# Patient Record
Sex: MALE | Race: WHITE | NOT HISPANIC OR LATINO | URBAN - METROPOLITAN AREA
[De-identification: names, ages, dates, MRNs, and addresses within clinical notes are randomized per-mention and may not be internally consistent; named-entity substitution may affect disease eponyms.]

---

## 2018-03-16 LAB — HCV AB SER-ACNC: NEGATIVE

## 2018-10-09 PROCEDURE — 88305 TISSUE EXAM BY PATHOLOGIST: CPT | Performed by: PATHOLOGY

## 2018-10-10 ENCOUNTER — LAB REQUISITION (OUTPATIENT)
Dept: LAB | Facility: HOSPITAL | Age: 63
End: 2018-10-10
Payer: MEDICARE

## 2018-10-10 DIAGNOSIS — D48.5 NEOPLASM OF UNCERTAIN BEHAVIOR OF SKIN: ICD-10-CM

## 2019-07-09 PROCEDURE — 88305 TISSUE EXAM BY PATHOLOGIST: CPT | Performed by: PATHOLOGY

## 2019-07-11 ENCOUNTER — LAB REQUISITION (OUTPATIENT)
Dept: LAB | Facility: HOSPITAL | Age: 64
End: 2019-07-11
Payer: MEDICARE

## 2019-07-11 DIAGNOSIS — D48.5 NEOPLASM OF UNCERTAIN BEHAVIOR OF SKIN: ICD-10-CM

## 2019-08-05 PROCEDURE — 88305 TISSUE EXAM BY PATHOLOGIST: CPT | Performed by: PATHOLOGY

## 2019-08-07 ENCOUNTER — LAB REQUISITION (OUTPATIENT)
Dept: LAB | Facility: HOSPITAL | Age: 64
End: 2019-08-07
Payer: MEDICARE

## 2019-08-07 DIAGNOSIS — D48.5 NEOPLASM OF UNCERTAIN BEHAVIOR OF SKIN: ICD-10-CM

## 2019-09-10 PROCEDURE — 88305 TISSUE EXAM BY PATHOLOGIST: CPT | Performed by: PATHOLOGY

## 2019-09-11 ENCOUNTER — LAB REQUISITION (OUTPATIENT)
Dept: LAB | Facility: HOSPITAL | Age: 64
End: 2019-09-11
Payer: MEDICARE

## 2019-09-11 DIAGNOSIS — D48.5 NEOPLASM OF UNCERTAIN BEHAVIOR OF SKIN: ICD-10-CM

## 2019-10-10 PROCEDURE — 88304 TISSUE EXAM BY PATHOLOGIST: CPT | Performed by: PATHOLOGY

## 2019-10-11 ENCOUNTER — LAB REQUISITION (OUTPATIENT)
Dept: LAB | Facility: HOSPITAL | Age: 64
End: 2019-10-11
Payer: MEDICARE

## 2019-10-11 DIAGNOSIS — D48.5 NEOPLASM OF UNCERTAIN BEHAVIOR OF SKIN: ICD-10-CM

## 2020-04-15 DIAGNOSIS — G89.29 CHRONIC BACK PAIN, UNSPECIFIED BACK LOCATION, UNSPECIFIED BACK PAIN LATERALITY: Primary | ICD-10-CM

## 2020-04-15 DIAGNOSIS — M54.9 CHRONIC BACK PAIN, UNSPECIFIED BACK LOCATION, UNSPECIFIED BACK PAIN LATERALITY: Primary | ICD-10-CM

## 2020-07-01 ENCOUNTER — TELEPHONE (OUTPATIENT)
Dept: FAMILY MEDICINE CLINIC | Facility: CLINIC | Age: 65
End: 2020-07-01

## 2020-07-01 DIAGNOSIS — J32.9 SINUSITIS, UNSPECIFIED CHRONICITY, UNSPECIFIED LOCATION: Primary | ICD-10-CM

## 2020-07-01 RX ORDER — AZITHROMYCIN 250 MG/1
TABLET, FILM COATED ORAL
Qty: 6 TABLET | Refills: 0 | Status: SHIPPED | OUTPATIENT
Start: 2020-07-01 | End: 2020-07-06

## 2020-07-01 NOTE — TELEPHONE ENCOUNTER
[id number] I guess we can order Anjelica Rush for Jorge Grand (I don't think he's allergic) and some claritin daily and pseudophed

## 2020-07-01 NOTE — TELEPHONE ENCOUNTER
Patient called said he has sinus infection symptoms, has been taking his roommates antibodic, but his left sinus cavity is still congested, he mentioned and antihistamine script        Nicholas Toro    Patient ph # [phone]

## 2020-10-09 ENCOUNTER — TELEPHONE (OUTPATIENT)
Dept: FAMILY MEDICINE CLINIC | Facility: CLINIC | Age: 65
End: 2020-10-09

## 2020-10-09 DIAGNOSIS — J32.9 OTHER SINUSITIS, UNSPECIFIED CHRONICITY: Primary | ICD-10-CM

## 2020-10-09 RX ORDER — AZITHROMYCIN 250 MG/1
TABLET, FILM COATED ORAL
Qty: 6 TABLET | Refills: 0 | Status: SHIPPED | OUTPATIENT
Start: 2020-10-09 | End: 2020-10-13

## 2020-11-03 DIAGNOSIS — Z76.0 MEDICATION REFILL: Primary | ICD-10-CM

## 2020-11-03 RX ORDER — GABAPENTIN 300 MG/1
CAPSULE ORAL
Qty: 60 CAPSULE | Refills: 0 | Status: SHIPPED | OUTPATIENT
Start: 2020-11-03 | End: 2020-12-07

## 2020-11-10 RX ORDER — CALCIUM CITRATE/VITAMIN D3 200MG-6.25
TABLET ORAL
COMMUNITY
End: 2020-11-11

## 2020-11-10 RX ORDER — NAPROXEN SODIUM 220 MG
220 TABLET ORAL
COMMUNITY
End: 2020-12-30 | Stop reason: ALTCHOICE

## 2020-11-10 RX ORDER — SENNOSIDES 8.6 MG
650 CAPSULE ORAL EVERY 8 HOURS PRN
COMMUNITY

## 2020-11-10 RX ORDER — OMEPRAZOLE 20 MG/1
20 CAPSULE, DELAYED RELEASE ORAL DAILY
COMMUNITY

## 2020-11-10 RX ORDER — ERGOCALCIFEROL 1.25 MG/1
5000 CAPSULE ORAL DAILY
COMMUNITY
End: 2020-12-30 | Stop reason: ALTCHOICE

## 2020-11-10 RX ORDER — TRAMADOL HYDROCHLORIDE 50 MG/1
100 TABLET ORAL DAILY PRN
COMMUNITY
End: 2020-11-11

## 2020-11-11 ENCOUNTER — TELEPHONE (OUTPATIENT)
Dept: FAMILY MEDICINE CLINIC | Facility: CLINIC | Age: 65
End: 2020-11-11

## 2020-11-11 ENCOUNTER — OFFICE VISIT (OUTPATIENT)
Dept: FAMILY MEDICINE CLINIC | Facility: CLINIC | Age: 65
End: 2020-11-11
Payer: MEDICARE

## 2020-11-11 VITALS
HEART RATE: 85 BPM | DIASTOLIC BLOOD PRESSURE: 70 MMHG | SYSTOLIC BLOOD PRESSURE: 162 MMHG | OXYGEN SATURATION: 95 % | HEIGHT: 74 IN | RESPIRATION RATE: 16 BRPM | WEIGHT: 280.38 LBS | BODY MASS INDEX: 35.98 KG/M2

## 2020-11-11 DIAGNOSIS — H57.12 EYE PAIN, LEFT: Primary | ICD-10-CM

## 2020-11-11 DIAGNOSIS — M54.2 NECK PAIN: ICD-10-CM

## 2020-11-11 PROBLEM — I44.7 LEFT BUNDLE BRANCH BLOCK: Status: ACTIVE | Noted: 2020-11-11

## 2020-11-11 PROBLEM — I47.1 ATRIAL TACHYCARDIA (HCC): Status: ACTIVE | Noted: 2020-11-11

## 2020-11-11 PROBLEM — I51.7 LEFT VENTRICULAR HYPERTROPHY: Status: ACTIVE | Noted: 2020-11-11

## 2020-11-11 PROBLEM — M54.9 CHRONIC BACK PAIN: Status: ACTIVE | Noted: 2020-11-11

## 2020-11-11 PROBLEM — E23.6 PITUITARY MASS (HCC): Status: ACTIVE | Noted: 2020-11-11

## 2020-11-11 PROBLEM — I47.19 ATRIAL TACHYCARDIA: Status: ACTIVE | Noted: 2020-11-11

## 2020-11-11 PROBLEM — Z98.1 H/O SPINAL FUSION: Status: ACTIVE | Noted: 2020-11-11

## 2020-11-11 PROBLEM — G89.29 CHRONIC BACK PAIN: Status: ACTIVE | Noted: 2020-11-11

## 2020-11-11 PROCEDURE — 99214 OFFICE O/P EST MOD 30 MIN: CPT | Performed by: INTERNAL MEDICINE

## 2020-11-11 RX ORDER — GLUCOSAMINE/D3/BOSWELLIA SERRA 1500MG-400
TABLET ORAL
COMMUNITY

## 2020-11-11 RX ORDER — CALCIUM CITRATE/VITAMIN D3 200MG-6.25
1 TABLET ORAL
COMMUNITY
End: 2020-11-11

## 2020-11-11 RX ORDER — METOPROLOL SUCCINATE 25 MG/1
25 TABLET, EXTENDED RELEASE ORAL 2 TIMES DAILY
COMMUNITY
Start: 2020-10-21 | End: 2020-12-30 | Stop reason: ALTCHOICE

## 2020-11-11 RX ORDER — ONDANSETRON 4 MG/1
4 TABLET, ORALLY DISINTEGRATING ORAL DAILY PRN
COMMUNITY
End: 2020-12-30 | Stop reason: ALTCHOICE

## 2020-11-11 RX ORDER — PREDNISONE 20 MG/1
TABLET ORAL
Qty: 60 TABLET | Refills: 0 | Status: SHIPPED | OUTPATIENT
Start: 2020-11-11 | End: 2020-12-30 | Stop reason: ALTCHOICE

## 2020-11-11 RX ORDER — FLUTICASONE PROPIONATE 50 MCG
2 SPRAY, SUSPENSION (ML) NASAL
COMMUNITY
End: 2021-03-08

## 2020-11-11 RX ORDER — FINASTERIDE 5 MG/1
5 TABLET, FILM COATED ORAL 2 TIMES DAILY
COMMUNITY
Start: 2020-10-07 | End: 2021-09-03

## 2020-11-11 RX ORDER — TADALAFIL 20 MG/1
20 TABLET ORAL
COMMUNITY
End: 2021-09-03

## 2020-11-13 ENCOUNTER — TELEPHONE (OUTPATIENT)
Dept: FAMILY MEDICINE CLINIC | Facility: CLINIC | Age: 65
End: 2020-11-13

## 2020-11-18 ENCOUNTER — TELEPHONE (OUTPATIENT)
Dept: FAMILY MEDICINE CLINIC | Facility: CLINIC | Age: 65
End: 2020-11-18

## 2020-11-19 DIAGNOSIS — M31.6 TEMPORAL ARTERITIS (HCC): Primary | ICD-10-CM

## 2020-12-07 DIAGNOSIS — Z76.0 MEDICATION REFILL: ICD-10-CM

## 2020-12-07 RX ORDER — GABAPENTIN 300 MG/1
CAPSULE ORAL
Qty: 60 CAPSULE | Refills: 0 | Status: SHIPPED | OUTPATIENT
Start: 2020-12-07 | End: 2021-01-04 | Stop reason: SDUPTHER

## 2020-12-27 ENCOUNTER — NURSE TRIAGE (OUTPATIENT)
Dept: OTHER | Facility: OTHER | Age: 65
End: 2020-12-27

## 2020-12-27 DIAGNOSIS — R11.0 NAUSEA: Primary | ICD-10-CM

## 2020-12-27 RX ORDER — ONDANSETRON 4 MG/1
4 TABLET, ORALLY DISINTEGRATING ORAL EVERY 6 HOURS PRN
Qty: 20 TABLET | Refills: 0 | Status: SHIPPED | OUTPATIENT
Start: 2020-12-27 | End: 2021-03-08

## 2020-12-30 PROBLEM — Z86.010 HISTORY OF COLONIC POLYPS: Status: ACTIVE | Noted: 2020-12-30

## 2020-12-30 PROBLEM — K76.0 FATTY LIVER: Status: ACTIVE | Noted: 2020-12-30

## 2020-12-30 PROBLEM — K31.A0 INTESTINAL METAPLASIA OF GASTRIC CARDIA: Status: ACTIVE | Noted: 2020-12-30

## 2020-12-30 PROBLEM — J45.909 ASTHMA: Status: ACTIVE | Noted: 2020-12-30

## 2020-12-30 PROBLEM — Z86.0100 HISTORY OF COLONIC POLYPS: Status: ACTIVE | Noted: 2020-12-30

## 2021-01-04 DIAGNOSIS — Z76.0 MEDICATION REFILL: ICD-10-CM

## 2021-01-04 RX ORDER — GABAPENTIN 300 MG/1
300 CAPSULE ORAL 2 TIMES DAILY
Qty: 60 CAPSULE | Refills: 3 | Status: SHIPPED | OUTPATIENT
Start: 2021-01-04 | End: 2021-05-03

## 2021-01-04 NOTE — TELEPHONE ENCOUNTER
gabapentin (NEURONTIN) 300 mg capsule takes 2 daily at bedtime Qty 60 with refill to [facility] #[id number]   [place]

## 2021-02-03 ENCOUNTER — TELEMEDICINE (OUTPATIENT)
Dept: FAMILY MEDICINE CLINIC | Facility: CLINIC | Age: 66
End: 2021-02-03
Payer: MEDICARE

## 2021-02-03 VITALS — BODY MASS INDEX: 35.68 KG/M2 | WEIGHT: 278 LBS | HEIGHT: 74 IN | TEMPERATURE: 97.6 F

## 2021-02-03 DIAGNOSIS — R39.9 UTI SYMPTOMS: Primary | ICD-10-CM

## 2021-02-03 PROCEDURE — 99213 OFFICE O/P EST LOW 20 MIN: CPT | Performed by: INTERNAL MEDICINE

## 2021-02-03 RX ORDER — FLUCONAZOLE 150 MG/1
TABLET ORAL
Qty: 7 TABLET | Refills: 3 | Status: SHIPPED | OUTPATIENT
Start: 2021-02-03 | End: 2021-02-04 | Stop reason: SDUPTHER

## 2021-02-03 RX ORDER — SULFAMETHOXAZOLE AND TRIMETHOPRIM 800; 160 MG/1; MG/1
1 TABLET ORAL EVERY 12 HOURS SCHEDULED
Qty: 14 TABLET | Refills: 0 | Status: SHIPPED | OUTPATIENT
Start: 2021-02-03 | End: 2021-02-09 | Stop reason: SDUPTHER

## 2021-02-03 NOTE — PROGRESS NOTES
Virtual Regular Visit      Assessment/Plan:    Problem List Items Addressed This Visit     None      Visit Diagnoses     UTI symptoms    -  Primary    Relevant Medications    sulfamethoxazole-trimethoprim (BACTRIM DS) 800-160 mg per tablet    fluconazole (DIFLUCAN) 150 mg tablet    BMI 35 0-35 9,adult                   Reason for visit is   Chief Complaint   Patient presents with    Virtual Regular Visit     [employer]    Dark Urine    Difficulty Urinating    Urinary Frequency    x 4-5 days        Encounter provider Haily Stein MD    Provider located at [address] [place] [id number] [phone]      Recent Visits  No visits were found meeting these conditions  Showing recent visits within past 7 days and meeting all other requirements     Today's Visits  Date Type Provider Dept   02/03/21 Telemedicine Brendon Camacho MD  [facility] today's visits and meeting all other requirements     Future Appointments  No visits were found meeting these conditions  Showing future appointments within next 150 days and meeting all other requirements        The patient was identified by name and date of birth  Wesley David was informed that this is a telemedicine visit and that the visit is being conducted through [employer] and patient was informed that this is not a secure, HIPAA-compliant platform  He agrees to proceed     My office door was closed  No one else was in the room  He acknowledged consent and understanding of privacy and security of the video platform  The patient has agreed to participate and understands they can discontinue the visit at any time  Patient is aware this is a billable service  Subjective  Wesley David is a 76 y o  male with urinary burning, groin irritation         Ada Perfect has been having burning, frequency, and dark urine for a few days-has also had what he thinks is groin rash that is itchy and smells, and seems fungal-he is circumcised-no discharge, no fever or back pain or vomiting-does have some nausea-       Past Medical History:   Diagnosis Date    Alkaline phosphatase raised     Arthralgia of foot     arthralgia of the ankle and/or foot    Arthritis     Aspirin allergy     Asthma     Chronic back pain     Cobalamin deficiency     Displacement of cervical intervertebral disc without myelopathy     Edema of lower extremity     Gallstone     GERD (gastroesophageal reflux disease)     Heart murmur     Hepatitis B     History of diabetes mellitus     History of echocardiogram 02/23/2017    Nondilated LV with well-preserved systolic function and estimated EF of 50%  Mild septal dyskinesis was noted with otherwise normal wall motion  Moderate to severe LVH was noted with evidence of grade I diastolic dysfunction  Aortic valve sclerosis and mitral annular calcifcation were present with no evidence of significant valvular stenosis  Color Doppler examination as remarkable for trace MR, 1    History of EKG 09/15/2017    Sinus rhythm with 1st degree AV block, Left bundle branch block, Abnormal ECG  EKG 5/25/17-Sinus rhythm with 1st degree AV block, Sinus rhythm with occasional premature ventricular complexes and fusion complexes  Cannot rule out Anteroseptal infarct, age undertermind  T wave abnormality, consider lateral ischemia abnormal ECG  EKG 3/24/17-Sinus rhythm with 1st degree AV block with occasional giorgio    History of stress test 03/2017     Mildly abnormal study with evidence of a fixed inferoseptal defect  This is most likely related to attenuation artifact, however, the possibility of a nontransmural myocardial infarction involving these segments cannot be completely excluded  Quantitative analysis indicates equivocal ischemia in the mid segment of the septum  This is most likely related to artifacts   Gated wall motion analysis wa    Hypertension     Hypoglycemia     Hypothyroidism     Increased body mass index     Induration penis plastica     Mononeuritis     Neuropathy     Non-alcoholic fatty liver disease     Obesity     Osteopenia     Osteoporosis     Palpitations     Pituitary adenoma (HCC)     Pneumonia     Sebaceous cyst     infection of sebaceous cyst    Testicular hypofunction     Thrombocytopenic disorder (HCC)     Tremor     Vitamin D deficiency        Past Surgical History:   Procedure Laterality Date    BACK SURGERY  2010 2010- Fusion L4-L5-S1    COLOGUARD (HISTORICAL)  04/01/2019    Negative    CYST REMOVAL  1987 1987- Sebaceous cyst removed from back    EGD  07/15/2019    EGD  04/03/2018    HERNIA REPAIR  1957    LAMINECTOMY      Laminectomy with internal fixation L4-S1    LIPOMA RESECTION Right 2014    abdomen    LUMBAR FUSION  2010    L4-L5-S1    MOLE REMOVAL      back and face    TOENAIL EXCISION Left 1993       Current Outpatient Medications   Medication Sig Dispense Refill    acetaminophen (TYLENOL) 650 mg CR tablet Take 650 mg by mouth every 8 (eight) hours as needed      B Complex Vitamins (VITAMIN B COMPLEX PO) Take 1 tablet by mouth 2 (two) times a day       Biotin 64320 MCG TABS       Calcium Carb-Cholecalciferol (CALCIUM/VITAMIN D PO)       carvedilol (COREG) 3 125 mg tablet Take 3 125 mg by mouth 2 (two) times a day with meals      Cholecalciferol (Vitamin D3) 50 MCG (2000 UT) TABS Take 2,000 Units by mouth daily      finasteride (PROSCAR) 5 mg tablet Take 5 mg by mouth 2 (two) times a day       fluticasone (FLONASE) 50 mcg/act nasal spray 2 sprays into each nostril      gabapentin (NEURONTIN) 300 mg capsule Take 1 capsule (300 mg total) by mouth 2 (two) times a day 60 capsule 3    Ginger, Zingiber officinalis, (Ginger Root) 500 MG CAPS Take 1 capsule by mouth 3 (three) times a day      Lactobacillus (ACIDOPHILUS PO) Take 1 tablet by mouth 2 (two) times a day      omeprazole (PriLOSEC) 20 mg delayed release capsule Take 20 mg by mouth daily      ondansetron (ZOFRAN-ODT) 4 mg disintegrating tablet Take 1 tablet (4 mg total) by mouth every 6 (six) hours as needed for nausea or vomiting 20 tablet 0    Polyethyl Glycol-Propyl Glycol (SYSTANE OP)       primidone (MYSOLINE) 50 mg tablet Take 100 mg by mouth daily at bedtime      Simethicone (GAS-X PO) Take by mouth 3 (three) times a day with meals      tadalafil (Cialis) 20 MG tablet Take 20 mg by mouth      traMADol (ULTRAM) 50 mg tablet Take 50 mg by mouth daily at bedtime      fluconazole (DIFLUCAN) 150 mg tablet Take 1 tablet PO X 1 and can repeat dose in 5-7 days as needed 7 tablet 3    sulfamethoxazole-trimethoprim (BACTRIM DS) 800-160 mg per tablet Take 1 tablet by mouth every 12 (twelve) hours for 7 days 14 tablet 0     No current facility-administered medications for this visit  Allergies   Allergen Reactions    Metoprolol Shortness Of Breath and Fatigue    Aspirin GI Intolerance    Ciprofloxacin Other (See Comments)     irregular heart rate    Propranolol Other (See Comments)     Dizzy, lathargic    Pollen Extract Other (See Comments)     Itchy eyes, runny nose, scratchy throat       Review of Systems   Constitutional: Negative for fever  Gastrointestinal: Negative for nausea and vomiting  Genitourinary: Positive for dysuria, frequency and urgency  Negative for discharge and hematuria  Musculoskeletal: Negative for back pain  Skin: Positive for rash  Video Exam    Vitals:    02/03/21 1034   Temp: 97 6 °F (36 4 °C)   TempSrc: Oral   Weight: 126 kg (278 lb)   Height: 6' 2" (1 88 m)       Physical Exam  Constitutional:       Appearance: Normal appearance  Eyes:      General: No scleral icterus  Neck:      Musculoskeletal: Normal range of motion  Pulmonary:      Effort: Pulmonary effort is normal    Neurological:      General: No focal deficit present  Mental Status: He is alert and oriented to person, place, and time  Psychiatric:         Mood and Affect: Mood normal          Behavior: Behavior normal           I spent 20 minutes directly with the patient during this visit      VIRTUAL VISIT DISCLAIMER    Angelica Moncho acknowledges that he has consented to an online visit or consultation  He understands that the online visit is based solely on information provided by him, and that, in the absence of a face-to-face physical evaluation by the physician, the diagnosis he receives is both limited and provisional in terms of accuracy and completeness  This is not intended to replace a full medical face-to-face evaluation by the physician  Angelica Ivan understands and accepts these terms  BMI Counseling: Body mass index is 35 69 kg/m²  The BMI is above normal  Nutrition recommendations include reducing portion sizes, decreasing overall calorie intake, 3-5 servings of fruits/vegetables daily, reducing fast food intake, consuming healthier snacks, decreasing soda and/or juice intake and moderation in carbohydrate intake

## 2021-02-04 DIAGNOSIS — R39.9 UTI SYMPTOMS: ICD-10-CM

## 2021-02-04 RX ORDER — FLUCONAZOLE 150 MG/1
TABLET ORAL
Qty: 5 TABLET | Refills: 3 | Status: SHIPPED | OUTPATIENT
Start: 2021-02-04 | End: 2021-02-11

## 2021-02-04 NOTE — TELEPHONE ENCOUNTER
[facility] (Urgent [facility]), is calling on the patients behalf  He picked up medication from pharmacy but only Bactrim was there, they did not dispense Diflucan  Can someone order this for him?

## 2021-02-09 ENCOUNTER — TELEPHONE (OUTPATIENT)
Dept: FAMILY MEDICINE CLINIC | Facility: CLINIC | Age: 66
End: 2021-02-09

## 2021-02-09 ENCOUNTER — OFFICE VISIT (OUTPATIENT)
Dept: FAMILY MEDICINE CLINIC | Facility: CLINIC | Age: 66
End: 2021-02-09
Payer: MEDICARE

## 2021-02-09 VITALS
RESPIRATION RATE: 16 BRPM | SYSTOLIC BLOOD PRESSURE: 142 MMHG | OXYGEN SATURATION: 96 % | HEART RATE: 73 BPM | WEIGHT: 276 LBS | BODY MASS INDEX: 35.42 KG/M2 | DIASTOLIC BLOOD PRESSURE: 80 MMHG | HEIGHT: 74 IN | TEMPERATURE: 98.1 F

## 2021-02-09 DIAGNOSIS — R39.9 UTI SYMPTOMS: Primary | ICD-10-CM

## 2021-02-09 DIAGNOSIS — R32 URINARY INCONTINENCE, UNSPECIFIED TYPE: ICD-10-CM

## 2021-02-09 DIAGNOSIS — R30.0 DYSURIA: ICD-10-CM

## 2021-02-09 DIAGNOSIS — N40.1 BENIGN PROSTATIC HYPERPLASIA WITH LOWER URINARY TRACT SYMPTOMS, SYMPTOM DETAILS UNSPECIFIED: ICD-10-CM

## 2021-02-09 DIAGNOSIS — R35.0 URINARY FREQUENCY: ICD-10-CM

## 2021-02-09 PROBLEM — I73.9 PAD (PERIPHERAL ARTERY DISEASE) (HCC): Status: ACTIVE | Noted: 2021-02-09

## 2021-02-09 PROBLEM — R16.1 SPLENOMEGALY: Status: ACTIVE | Noted: 2020-03-01

## 2021-02-09 PROBLEM — M75.41 IMPINGEMENT SYNDROME OF RIGHT SHOULDER: Status: ACTIVE | Noted: 2020-09-17

## 2021-02-09 PROBLEM — I51.7 LVH (LEFT VENTRICULAR HYPERTROPHY): Status: ACTIVE | Noted: 2020-11-17

## 2021-02-09 PROBLEM — M50.30 DDD (DEGENERATIVE DISC DISEASE), CERVICAL: Status: ACTIVE | Noted: 2021-01-15

## 2021-02-09 PROBLEM — Z98.890 S/P LAMINECTOMY: Status: ACTIVE | Noted: 2020-02-29

## 2021-02-09 PROBLEM — M25.511 RIGHT SHOULDER PAIN: Status: ACTIVE | Noted: 2020-09-08

## 2021-02-09 PROBLEM — G25.0 ESSENTIAL TREMOR: Status: ACTIVE | Noted: 2021-01-13

## 2021-02-09 PROBLEM — D69.6 THROMBOCYTOPENIA (HCC): Status: ACTIVE | Noted: 2020-03-01

## 2021-02-09 PROBLEM — M54.12 CERVICAL RADICULOPATHY: Status: ACTIVE | Noted: 2021-01-15

## 2021-02-09 LAB
SL AMB  POCT GLUCOSE, UA: NEGATIVE
SL AMB LEUKOCYTE ESTERASE,UA: NEGATIVE
SL AMB POCT BILIRUBIN,UA: NEGATIVE
SL AMB POCT BLOOD,UA: NEGATIVE
SL AMB POCT CLARITY,UA: CLEAR
SL AMB POCT COLOR,UA: YELLOW
SL AMB POCT KETONES,UA: NEGATIVE
SL AMB POCT NITRITE,UA: NEGATIVE
SL AMB POCT PH,UA: 6.5
SL AMB POCT SPECIFIC GRAVITY,UA: 1
SL AMB POCT URINE PROTEIN: NEGATIVE
SL AMB POCT UROBILINOGEN: 4

## 2021-02-09 PROCEDURE — 81002 URINALYSIS NONAUTO W/O SCOPE: CPT | Performed by: FAMILY MEDICINE

## 2021-02-09 PROCEDURE — 99214 OFFICE O/P EST MOD 30 MIN: CPT | Performed by: FAMILY MEDICINE

## 2021-02-09 PROCEDURE — 87086 URINE CULTURE/COLONY COUNT: CPT | Performed by: FAMILY MEDICINE

## 2021-02-09 RX ORDER — SULFAMETHOXAZOLE AND TRIMETHOPRIM 800; 160 MG/1; MG/1
1 TABLET ORAL EVERY 12 HOURS SCHEDULED
Qty: 14 TABLET | Refills: 0 | Status: SHIPPED | OUTPATIENT
Start: 2021-02-09 | End: 2021-02-16

## 2021-02-09 RX ORDER — NYSTATIN 100000 [USP'U]/G
1 POWDER TOPICAL 2 TIMES DAILY PRN
COMMUNITY
End: 2021-07-26

## 2021-02-09 RX ORDER — TAMSULOSIN HYDROCHLORIDE 0.4 MG/1
0.4 CAPSULE ORAL
Qty: 30 CAPSULE | Refills: 0 | Status: SHIPPED | OUTPATIENT
Start: 2021-02-09 | End: 2021-09-03

## 2021-02-09 NOTE — PROGRESS NOTES
Assessment/Plan:    No problem-specific Assessment & Plan notes found for this encounter  Diagnoses and all orders for this visit:    UTI symptoms  -     POCT urine dip  -     sulfamethoxazole-trimethoprim (BACTRIM DS) 800-160 mg per tablet; Take 1 tablet by mouth every 12 (twelve) hours for 7 days  -     Urine culture; Future    BMI 35 0-35 9,adult    Urinary frequency    Dysuria    Urinary incontinence, unspecified type    Benign prostatic hyperplasia with lower urinary tract symptoms, symptom details unspecified  -     tamsulosin (FLOMAX) 0 4 mg; Take 1 capsule (0 4 mg total) by mouth daily with dinner     Patient with known BPH for which he takes tamsulosin  Now with frequency, urgency, nocturia, dysuria and some leakage  Urinalysis here in office today was entirely normal    Prostatitis vs due to overactive bladder vs secondary to his BPH  Will send urine for culture  Will have him continue bactrim for another 7 days for total of 14 days  Unable to use cipro as he has allergy to this  Will give him rx for flomax once daily to see if this helps his flow  Discussed potential side effects  Advised he call his urologist (Dr Rosa Lopez) for appt  He was advised that he should call if any problems scheduling this appt  Subjective:      Patient ID: Duke Green is a 76 y o  male with atrial tachycardia, cervical radiculopathy, essential tremor, pituitary adenoma, bph, ED here today for acute visit  Is complaining of dysuria, urinary frequency, nocturia and straining to have to urinate  He has some leakage of urine as well  Symptoms ongoing for last month  No hematuria, abdominal pain, flank pain  No history of kidney stones  He was seen by video visit on 2/3/21 by Dr Tristan Morley for complaint of urinary burning and groin rash  Was treated empirically with Bactrim and po diflucan  He is on last day of bactrim and does not see any improvement in his symptoms       He follows with urology at [facility] (Dr Ata Frias) and was there on 1/6/21 for his nocturia, bph and ED  He is taking finasteride  Has had some cold symptoms in the last month as well for which he has been taking otc cold meds (mucinex)    HPI    The following portions of the patient's history were reviewed and updated as appropriate:   He  has a past medical history of Alkaline phosphatase raised, Arthralgia of foot, Arthritis, Aspirin allergy, Asthma, Chronic back pain, Cobalamin deficiency, Displacement of cervical intervertebral disc without myelopathy, Edema of lower extremity, Gallstone, GERD (gastroesophageal reflux disease), Heart murmur, Hepatitis B, History of diabetes mellitus, History of echocardiogram (02/23/2017), History of EKG (09/15/2017), History of stress test (03/2017), Hypertension, Hypoglycemia, Hypothyroidism, Increased body mass index, Induration penis plastica, Mononeuritis, Neuropathy, Non-alcoholic fatty liver disease, Obesity, Osteopenia, Osteoporosis, Palpitations, Pituitary adenoma ([address] ), Pneumonia, Sebaceous cyst, Testicular hypofunction, Thrombocytopenic disorder ([address] ), Tremor, and Vitamin D deficiency  He  has a past surgical history that includes Lipoma resection (Right, 2014); Lumbar fusion (2010); Toenail excision (Left, 1993); Laminectomy; Mole removal; Hernia repair (5861); EGD (07/15/2019); EGD (04/03/2018); Back surgery (2010); Cyst Removal (1987); and Cologuard (Historical) (04/01/2019)  He  reports that he has never smoked  He has never used smokeless tobacco  He reports previous alcohol use  He reports previous drug use    Current Outpatient Medications on File Prior to Visit   Medication Sig    acetaminophen (TYLENOL) 650 mg CR tablet Take 650 mg by mouth every 8 (eight) hours as needed    B Complex Vitamins (VITAMIN B COMPLEX PO) Take 1 tablet by mouth 2 (two) times a day     Biotin 73266 MCG TABS     Calcium Carb-Cholecalciferol (CALCIUM/VITAMIN D PO)     carvedilol (COREG) 3 125 mg tablet Take 3 125 mg by mouth 2 (two) times a day with meals    Cholecalciferol (Vitamin D3) 50 MCG (2000 UT) TABS Take 2,000 Units by mouth daily    finasteride (PROSCAR) 5 mg tablet Take 5 mg by mouth 2 (two) times a day     fluticasone (FLONASE) 50 mcg/act nasal spray 2 sprays into each nostril    gabapentin (NEURONTIN) 300 mg capsule Take 1 capsule (300 mg total) by mouth 2 (two) times a day    Ginger, Zingiber officinalis, (Ginger Root) 500 MG CAPS Take 1 capsule by mouth 3 (three) times a day    Lactobacillus (ACIDOPHILUS PO) Take 1 tablet by mouth 2 (two) times a day    nystatin (MYCOSTATIN) powder Apply 1 application topically 2 (two) times a day as needed    omeprazole (PriLOSEC) 20 mg delayed release capsule Take 20 mg by mouth daily    ondansetron (ZOFRAN-ODT) 4 mg disintegrating tablet Take 1 tablet (4 mg total) by mouth every 6 (six) hours as needed for nausea or vomiting    Polyethyl Glycol-Propyl Glycol (SYSTANE OP)     primidone (MYSOLINE) 50 mg tablet Take 100 mg by mouth daily at bedtime    Simethicone (GAS-X PO) Take by mouth 3 (three) times a day with meals    sulfamethoxazole-trimethoprim (BACTRIM DS) 800-160 mg per tablet Take 1 tablet by mouth every 12 (twelve) hours for 7 days    tadalafil (Cialis) 20 MG tablet Take 20 mg by mouth    traMADol (ULTRAM) 50 mg tablet Take 50 mg by mouth daily at bedtime          No current facility-administered medications on file prior to visit  He is allergic to metoprolol; aspirin; ciprofloxacin; propranolol; and pollen extract       Review of Systems   Constitutional: Negative for chills and fever  Gastrointestinal: Positive for diarrhea  Negative for abdominal pain, constipation, nausea and vomiting  Genitourinary: Positive for difficulty urinating, dysuria and frequency  Negative for flank pain and hematuria  Musculoskeletal: Negative for back pain           Objective:      Pulse 73   Temp 98 1 °F (36 7 °C) (Temporal)   Resp 16   Ht 6' 2" (1 88 m)   Wt 125 kg (276 lb)   SpO2 96%   BMI 35 44 kg/m²          Physical Exam  Vitals signs and nursing note reviewed  Constitutional:       Appearance: Normal appearance  He is obese  HENT:      Head: Normocephalic and atraumatic  Eyes:      Extraocular Movements: Extraocular movements intact  Conjunctiva/sclera: Conjunctivae normal       Pupils: Pupils are equal, round, and reactive to light  Cardiovascular:      Rate and Rhythm: Normal rate and regular rhythm  Pulmonary:      Effort: Pulmonary effort is normal       Breath sounds: Normal breath sounds  Genitourinary:     Comments: Prostate enlarged, no nodules  boggy  Musculoskeletal:      Right lower leg: No edema  Left lower leg: No edema  Neurological:      Mental Status: He is alert

## 2021-02-10 LAB — BACTERIA UR CULT: NORMAL

## 2021-02-10 NOTE — RESULT ENCOUNTER NOTE
Since there is no infection, he may d/c antibiotic  Will place referral to [facility]  Please let us know if difficulty scheduling  Thanks

## 2021-02-10 NOTE — RESULT ENCOUNTER NOTE
Please call patient to let him know that his urine culture did not show evidence of infection     Find out if he was able to schedule appt with his urologist, Dr Rose Schools

## 2021-02-16 ENCOUNTER — TELEPHONE (OUTPATIENT)
Dept: FAMILY MEDICINE CLINIC | Facility: CLINIC | Age: 66
End: 2021-02-16

## 2021-02-16 NOTE — TELEPHONE ENCOUNTER
Pt  Called stating he is still having really bad diarrhea  He states he went thru a whole bottle of imodium and nothing is working  He also states he has a chronic Head ache  He was just seen by Dr Praful Dasilva last week

## 2021-02-16 NOTE — TELEPHONE ENCOUNTER
I spoke with Detra Schirmer about his diarrhea-was just on course of Bactrim and now has diarrhea that has been persistent for several days-tried immodium but not that effective-will give it a few days and if it doesn't go away we will order a stool culture and C diff-he will try some pepto bismol and also drink clear fluids, and eat a dairy free gluten free bland diet-may need GI referral as well if not better-for his head congestion and rhinorrhea we thought a trial of claritin and a nose spray might help-he will try it

## 2021-03-05 ENCOUNTER — TELEPHONE (OUTPATIENT)
Dept: FAMILY MEDICINE CLINIC | Facility: CLINIC | Age: 66
End: 2021-03-05

## 2021-03-05 ENCOUNTER — TELEPHONE (OUTPATIENT)
Dept: UROLOGY | Facility: MEDICAL CENTER | Age: 66
End: 2021-03-05

## 2021-03-05 NOTE — TELEPHONE ENCOUNTER
Patient called urology to see if he could get in sooner but they put him on a waiting list  Wants to know if he should see you in the meanwhile

## 2021-03-05 NOTE — TELEPHONE ENCOUNTER
New Patient will be seen in the [facility] office  Patient was transferred by PCP office  Patient advised that he is still having burning at the tip of his penis but his urine culture came back negative  Patient advised that he would like to be seen sooner if possible  Please advise

## 2021-03-08 ENCOUNTER — OFFICE VISIT (OUTPATIENT)
Dept: FAMILY MEDICINE CLINIC | Facility: CLINIC | Age: 66
End: 2021-03-08
Payer: MEDICARE

## 2021-03-08 VITALS
SYSTOLIC BLOOD PRESSURE: 152 MMHG | TEMPERATURE: 98.1 F | BODY MASS INDEX: 36.57 KG/M2 | HEIGHT: 74 IN | WEIGHT: 285 LBS | HEART RATE: 72 BPM | DIASTOLIC BLOOD PRESSURE: 80 MMHG | RESPIRATION RATE: 16 BRPM | OXYGEN SATURATION: 96 %

## 2021-03-08 DIAGNOSIS — R30.0 DYSURIA: Primary | ICD-10-CM

## 2021-03-08 LAB
SL AMB  POCT GLUCOSE, UA: NEGATIVE
SL AMB LEUKOCYTE ESTERASE,UA: NEGATIVE
SL AMB POCT BILIRUBIN,UA: NEGATIVE
SL AMB POCT BLOOD,UA: NEGATIVE
SL AMB POCT CLARITY,UA: CLEAR
SL AMB POCT COLOR,UA: YELLOW
SL AMB POCT KETONES,UA: NEGATIVE
SL AMB POCT NITRITE,UA: NEGATIVE
SL AMB POCT PH,UA: 7
SL AMB POCT SPECIFIC GRAVITY,UA: 1.01
SL AMB POCT URINE PROTEIN: NEGATIVE
SL AMB POCT UROBILINOGEN: 4

## 2021-03-08 PROCEDURE — 99214 OFFICE O/P EST MOD 30 MIN: CPT | Performed by: INTERNAL MEDICINE

## 2021-03-08 PROCEDURE — 81002 URINALYSIS NONAUTO W/O SCOPE: CPT | Performed by: INTERNAL MEDICINE

## 2021-03-08 PROCEDURE — 87591 N.GONORRHOEAE DNA AMP PROB: CPT | Performed by: INTERNAL MEDICINE

## 2021-03-08 PROCEDURE — 87491 CHLMYD TRACH DNA AMP PROBE: CPT | Performed by: INTERNAL MEDICINE

## 2021-03-08 RX ORDER — MOMETASONE FUROATE 50 UG/1
2 SPRAY, METERED NASAL DAILY
COMMUNITY
End: 2021-04-19

## 2021-03-08 RX ORDER — VALACYCLOVIR HYDROCHLORIDE 500 MG/1
500 TABLET, FILM COATED ORAL 2 TIMES DAILY
Qty: 10 TABLET | Refills: 0 | Status: SHIPPED | OUTPATIENT
Start: 2021-03-08 | End: 2021-07-26

## 2021-03-08 NOTE — PROGRESS NOTES
Assessment/Plan:         Problem List Items Addressed This Visit        Other    Dysuria - Primary     Send urine out again for culture, although he's been taking Bactrim and it may skew the urine-in light of his past hx of HSV, and the fact that HSV can present as dysuria, will go ahead and run a course of Valtrex to see if it helps-he should get in to see a Urologist sooner than May so will refer to a different Urologist-advised to drink lots of water and cranberry juice-we did also discuss interstitial cystitis but Mirian Rdz really doesn't seem to have the urgency, frequency, etc that goes along with that         Relevant Medications    valACYclovir (VALTREX) 500 mg tablet    Other Relevant Orders    POCT urine dip (Completed)    Chlamydia/GC amplified DNA by PCR    UA/M w/rflx Culture, Comp    Ambulatory referral to Urology      Other Visit Diagnoses     BMI 36 0-36 9,adult                Subjective:      Patient ID: Tigre Betts is a 76 y o  male      Mirian Rdz here because he is continuing to have burning at the head of his penis-not only when he urinates but also all day, at least on Saturday-he did restart some Bactrim and it seemed to help some, but burning is still there-Cedric says he has not been sexually active in over 8 years-he says he self masturbates and noticed that he experiences worse burning after he expresses some pre-semen-he told me today that 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-has not had an outbreak in many years, although he did notice a groin sore recently-he has been trying to get in to see a Urologist but of the ones he contacted he can't get in until May      The following portions of the patient's history were reviewed and updated as appropriate:   Past Medical History:  He has a past medical history of Alkaline phosphatase raised, Arthralgia of foot, Arthritis, Aspirin allergy, Asthma, Chronic back pain, Cobalamin deficiency, Displacement of cervical intervertebral disc without myelopathy, Edema of lower extremity, Gallstone, GERD (gastroesophageal reflux disease), Heart murmur, Hepatitis B, History of diabetes mellitus, History of echocardiogram (02/23/2017), History of EKG (09/15/2017), History of stress test (03/2017), Hypertension, Hypoglycemia, Hypothyroidism, Increased body mass index, Induration penis plastica, Mononeuritis, Neuropathy, Non-alcoholic fatty liver disease, Obesity, Osteopenia, Osteoporosis, Palpitations, [facility] ), Pneumonia, Sebaceous cyst, Testicular hypofunction, Thrombocytopenic disorder ([facility] ), Tremor, and Vitamin D deficiency  ,  _______________________________________________________________________  Medical Problems:  does not have any pertinent problems on file ,  _______________________________________________________________________  Past Surgical History:   has a past surgical history that includes Lipoma resection (Right, 2014); Lumbar fusion (2010); Toenail excision (Left, 1993); Laminectomy; Mole removal; Hernia repair (8642); EGD (07/15/2019); EGD (04/03/2018); Back surgery (2010); Cyst Removal (1987); and Cologuard (Historical) (04/01/2019)  ,  _______________________________________________________________________  Family History:  family history includes Aortic stenosis in his father; Atrial fibrillation in his father; Coronary artery disease in his father; Dementia in his father; Heart disease in his father; Other in his father; Stroke in his father ,  _______________________________________________________________________  Social History:   reports that he has never smoked  He has never used smokeless tobacco  He reports previous alcohol use  He reports previous drug use ,  _______________________________________________________________________  Allergies:  is allergic to metoprolol; aspirin; ciprofloxacin; propranolol; and pollen extract     _______________________________________________________________________  Current Outpatient Medications   Medication Sig Dispense Refill    acetaminophen (TYLENOL) 650 mg CR tablet Take 650 mg by mouth every 8 (eight) hours as needed      Biotin 26239 MCG TABS       Calcium Carb-Cholecalciferol (CALCIUM/VITAMIN D PO)       Cholecalciferol (Vitamin D3) 50 MCG (2000 UT) TABS Take 2,000 Units by mouth daily      finasteride (PROSCAR) 5 mg tablet Take 5 mg by mouth 2 (two) times a day       gabapentin (NEURONTIN) 300 mg capsule Take 1 capsule (300 mg total) by mouth 2 (two) times a day 60 capsule 3    Ginger, Zingiber officinalis, (Ginger Root) 500 MG CAPS Take 1 capsule by mouth 3 (three) times a day      Lactobacillus (ACIDOPHILUS PO) Take 1 tablet by mouth 2 (two) times a day      mometasone (Nasonex) 50 mcg/act nasal spray 2 sprays into each nostril daily      omeprazole (PriLOSEC) 20 mg delayed release capsule Take 20 mg by mouth daily      Polyethyl Glycol-Propyl Glycol (SYSTANE OP)       primidone (MYSOLINE) 50 mg tablet Take 100 mg by mouth daily at bedtime      tadalafil (Cialis) 20 MG tablet Take 20 mg by mouth      tamsulosin (FLOMAX) 0 4 mg Take 1 capsule (0 4 mg total) by mouth daily with dinner 30 capsule 0    traMADol (ULTRAM) 50 mg tablet Take 50 mg by mouth daily at bedtime      VERAPAMIL HCL PO 1 po daily      nystatin (MYCOSTATIN) powder Apply 1 application topically 2 (two) times a day as needed      valACYclovir (VALTREX) 500 mg tablet Take 1 tablet (500 mg total) by mouth 2 (two) times a day for 5 days 10 tablet 0     No current facility-administered medications for this visit       _______________________________________________________________________  Review of Systems   Constitutional: Negative for fatigue and fever  Genitourinary: Positive for dysuria and penile pain  Negative for discharge, enuresis, flank pain, genital sores, hematuria, scrotal swelling, testicular pain and urgency           Objective:  Vitals:    03/08/21 1123   BP: 152/80   BP Location: Right arm   Patient Position: Sitting   Cuff Size: Adult   Pulse: 72   Resp: 16   Temp: 98 1 °F (36 7 °C)   TempSrc: Temporal   SpO2: 96%   Weight: 129 kg (285 lb)   Height: 6' 2" (1 88 m)     Body mass index is 36 59 kg/m²  Physical Exam  Constitutional:       Appearance: Normal appearance  He is obese  Genitourinary:     Comments: Deferred  Neurological:      General: No focal deficit present  Mental Status: He is alert  Mental status is at baseline  He is disoriented  Psychiatric:         Mood and Affect: Mood normal          Behavior: Behavior normal          Thought Content:  Thought content normal

## 2021-03-08 NOTE — ASSESSMENT & PLAN NOTE
Send urine out again for culture, although he's been taking Bactrim and it may skew the urine-in light of his past hx of HSV, and the fact that HSV can present as dysuria, will go ahead and run a course of Valtrex to see if it helps-he should get in to see a Urologist sooner than May so will refer to a different Urologist-advised to drink lots of water and cranberry juice-we did also discuss interstitial cystitis but Zoe Whitman really doesn't seem to have the urgency, frequency, etc that goes along with that

## 2021-03-08 NOTE — TELEPHONE ENCOUNTER
Per family [facility] notes patient placed on wait list  There is currently no sooner urology appts  He is seeing [facility] again in the interim

## 2021-03-10 ENCOUNTER — TELEPHONE (OUTPATIENT)
Dept: FAMILY MEDICINE CLINIC | Facility: CLINIC | Age: 66
End: 2021-03-10

## 2021-03-10 DIAGNOSIS — Z23 ENCOUNTER FOR IMMUNIZATION: ICD-10-CM

## 2021-03-10 LAB
C TRACH DNA SPEC QL NAA+PROBE: NEGATIVE
N GONORRHOEA DNA SPEC QL NAA+PROBE: NEGATIVE

## 2021-03-10 NOTE — TELEPHONE ENCOUNTER
Is using the Valtrex, and it's not helping & also wondering if you had any luck getting him into a urologist

## 2021-03-11 DIAGNOSIS — R30.0 DYSURIA: Primary | ICD-10-CM

## 2021-03-11 NOTE — TELEPHONE ENCOUNTER
I was hoping the valtrex would help, but if not he can stop it-we can refer to [facility] and Afshan Jain

## 2021-03-15 ENCOUNTER — TELEPHONE (OUTPATIENT)
Dept: FAMILY MEDICINE CLINIC | Facility: CLINIC | Age: 66
End: 2021-03-15

## 2021-03-26 ENCOUNTER — TELEPHONE (OUTPATIENT)
Dept: FAMILY MEDICINE CLINIC | Facility: CLINIC | Age: 66
End: 2021-03-26

## 2021-03-26 NOTE — TELEPHONE ENCOUNTER
Patient called about a couple things  He got his first covid vaccine on Monday  Has developed a slight cough and a runny nose  He usually gets allergies  He didn't know if it could be from the vaccine  He takes Claritin  I told him it was prob from his allergies  If you have any other suggestions for him, please let him know  Patient saw Dr Dustin Gleason  Put him on Keflex, Rapaflo and Pyridium  Wanted you to know

## 2021-03-29 ENCOUNTER — TELEPHONE (OUTPATIENT)
Dept: FAMILY MEDICINE CLINIC | Facility: CLINIC | Age: 66
End: 2021-03-29

## 2021-03-29 ENCOUNTER — TELEMEDICINE (OUTPATIENT)
Dept: FAMILY MEDICINE CLINIC | Facility: CLINIC | Age: 66
End: 2021-03-29
Payer: MEDICARE

## 2021-03-29 VITALS — TEMPERATURE: 97.4 F | HEIGHT: 74 IN | BODY MASS INDEX: 36.32 KG/M2 | WEIGHT: 283 LBS

## 2021-03-29 DIAGNOSIS — J40 BRONCHITIS: Primary | ICD-10-CM

## 2021-03-29 PROCEDURE — 99214 OFFICE O/P EST MOD 30 MIN: CPT | Performed by: INTERNAL MEDICINE

## 2021-03-29 RX ORDER — ALBUTEROL SULFATE 90 UG/1
2 AEROSOL, METERED RESPIRATORY (INHALATION) EVERY 6 HOURS PRN
Qty: 1 INHALER | Refills: 5 | Status: SHIPPED | OUTPATIENT
Start: 2021-03-29 | End: 2021-07-26

## 2021-04-15 ENCOUNTER — TELEPHONE (OUTPATIENT)
Dept: FAMILY MEDICINE CLINIC | Facility: CLINIC | Age: 66
End: 2021-04-15

## 2021-04-15 NOTE — TELEPHONE ENCOUNTER
I would think it's ok for him to do some exercises just do them as tolerated-sometimes after the second covid vaccine a person can develop some symptoms

## 2021-04-15 NOTE — TELEPHONE ENCOUNTER
Patient is going through Physical Therapy with [facility], &  He's asking if you could "sign in to Epic to see info"  His pulse ox is 93-94, and the physical therapist wants to give him exercises to do, but is hesitant to do so    2nd issue is he got his 2nd Covid vaccine & developed a cough, and wondering if that's normal?

## 2021-04-16 ENCOUNTER — TELEPHONE (OUTPATIENT)
Dept: FAMILY MEDICINE CLINIC | Facility: CLINIC | Age: 66
End: 2021-04-16

## 2021-04-19 RX ORDER — PRIMIDONE 50 MG/1
150 TABLET ORAL 2 TIMES DAILY
COMMUNITY
Start: 2021-04-07 | End: 2022-04-07

## 2021-04-19 RX ORDER — DOXYCYCLINE HYCLATE 100 MG/1
100 CAPSULE ORAL EVERY 12 HOURS
COMMUNITY
Start: 2021-03-28 | End: 2021-07-26

## 2021-04-20 ENCOUNTER — OFFICE VISIT (OUTPATIENT)
Dept: FAMILY MEDICINE CLINIC | Facility: CLINIC | Age: 66
End: 2021-04-20
Payer: MEDICARE

## 2021-04-20 VITALS
DIASTOLIC BLOOD PRESSURE: 72 MMHG | TEMPERATURE: 99 F | BODY MASS INDEX: 35.69 KG/M2 | SYSTOLIC BLOOD PRESSURE: 122 MMHG | HEART RATE: 76 BPM | RESPIRATION RATE: 16 BRPM | WEIGHT: 278.13 LBS | OXYGEN SATURATION: 96 % | HEIGHT: 74 IN

## 2021-04-20 DIAGNOSIS — R05.9 COUGH: Primary | ICD-10-CM

## 2021-04-20 DIAGNOSIS — Z13.31 STANDARDIZED ADULT DEPRESSION SCREENING TOOL COMPLETED: ICD-10-CM

## 2021-04-20 PROCEDURE — 99214 OFFICE O/P EST MOD 30 MIN: CPT | Performed by: INTERNAL MEDICINE

## 2021-04-20 RX ORDER — BENZONATATE 100 MG/1
100 CAPSULE ORAL 3 TIMES DAILY PRN
Qty: 30 CAPSULE | Refills: 0 | Status: SHIPPED | OUTPATIENT
Start: 2021-04-20 | End: 2021-07-26

## 2021-04-20 NOTE — PROGRESS NOTES
Assessment/Plan:         Problem List Items Addressed This Visit     None      Visit Diagnoses     Cough    -  Primary    I think post viral cough syndrome-continue flonase, and will order repeat CXR to be done in 3 weeks and order tessalon perles    Relevant Medications    benzonatate (TESSALON PERLES) 100 mg capsule    Other Relevant Orders    XR chest pa & lateral    Standardized adult depression screening tool completed                Subjective:      Patient ID: Pat Lane is a 78 y o  male  Romee Service here to discuss his persistent cough-had pneumonia several weeks back and was treated with doxycycline, and tested for covid and then went again to [facility] for coughing and had CXR showing some interstitial changes and was covid tested yet again-also negative-doing better now but still has persistent cough    Cough  This is a recurrent problem  The current episode started yesterday  The problem has been gradually worsening  The problem occurs every few minutes  The cough is non-productive  Associated symptoms include nasal congestion, postnasal drip and weight loss  Pertinent negatives include no chest pain, chills, ear congestion, ear pain, fever, headaches, heartburn, hemoptysis, myalgias, rash, rhinorrhea, sore throat, shortness of breath, sweats or wheezing  The symptoms are aggravated by exercise  Risk factors for lung disease include smoking/tobacco exposure  He has tried OTC cough suppressant for the symptoms  The treatment provided mild relief  His past medical history is significant for pneumonia         The following portions of the patient's history were reviewed and updated as appropriate:   Past Medical History:  He has a past medical history of Alkaline phosphatase raised, Arthralgia of foot, Arthritis, Aspirin allergy, Asthma, Chronic back pain, Cobalamin deficiency, Displacement of cervical intervertebral disc without myelopathy, Edema of lower extremity, Gallstone, GERD (gastroesophageal reflux disease), Heart murmur, Hepatitis B, History of diabetes mellitus, History of echocardiogram (02/23/2017), History of EKG (09/15/2017), History of stress test (03/2017), Hypertension, Hypoglycemia, Hypothyroidism, Increased body mass index, Induration penis plastica, Mononeuritis, Neuropathy, Non-alcoholic fatty liver disease, Obesity, Osteopenia, Osteoporosis, Palpitations, Pituitary adenoma ([facility] ), Pneumonia, Sebaceous cyst, Testicular hypofunction, Thrombocytopenic disorder ([facility] ), Tremor, and Vitamin D deficiency  ,  _______________________________________________________________________  Medical Problems:  does not have any pertinent problems on file ,  _______________________________________________________________________  Past Surgical History:   has a past surgical history that includes Lipoma resection (Right, 2014); Lumbar fusion (2010); Toenail excision (Left, 1993); Laminectomy; Mole removal; Hernia repair (3160); EGD (07/15/2019); EGD (04/03/2018); Back surgery (2010); Cyst Removal (1987); and Cologuard (Historical) (04/01/2019)  ,  _______________________________________________________________________  Family History:  family history includes Aortic stenosis in his father; Atrial fibrillation in his father; Coronary artery disease in his father; Dementia in his father; Heart disease in his father; Other in his father; Stroke in his father ,  _______________________________________________________________________  Social History:   reports that he has never smoked  He has never used smokeless tobacco  He reports previous alcohol use  He reports previous drug use ,  _______________________________________________________________________  Allergies:  is allergic to carvedilol; metoprolol; aspirin; ciprofloxacin; clindamycin; propranolol; and pollen extract     _______________________________________________________________________  Current Outpatient Medications   Medication Sig Dispense Refill    acetaminophen (TYLENOL) 650 mg CR tablet Take 650 mg by mouth every 8 (eight) hours as needed      ACETAMINOPHEN-CODEINE PO 1 po every 8 hours prn      albuterol (PROVENTIL HFA,VENTOLIN HFA) 90 mcg/act inhaler Inhale 2 puffs every 6 (six) hours as needed for wheezing or shortness of breath 1 Inhaler 5    Biotin 23326 MCG TABS       Calcium Carb-Cholecalciferol (CALCIUM/VITAMIN D PO)       Cholecalciferol (Vitamin D3) 50 MCG (2000 UT) TABS Take 2,000 Units by mouth daily      doxycycline hyclate (VIBRAMYCIN) 100 mg capsule Take 100 mg by mouth every 12 (twelve) hours      finasteride (PROSCAR) 5 mg tablet Take 5 mg by mouth 2 (two) times a day       gabapentin (NEURONTIN) 300 mg capsule Take 1 capsule (300 mg total) by mouth 2 (two) times a day 60 capsule 3    Ginger, Zingiber officinalis, (Ginger Root) 500 MG CAPS Take 1 capsule by mouth 3 (three) times a day      Lactobacillus (ACIDOPHILUS PO) Take 1 tablet by mouth 2 (two) times a day      nystatin (MYCOSTATIN) powder Apply 1 application topically 2 (two) times a day as needed      omeprazole (PriLOSEC) 20 mg delayed release capsule Take 20 mg by mouth daily      Polyethyl Glycol-Propyl Glycol (SYSTANE OP)       primidone (MYSOLINE) 50 mg tablet Take 150 mg by mouth 2 (two) times a day      tadalafil (Cialis) 20 MG tablet Take 20 mg by mouth      tamsulosin (FLOMAX) 0 4 mg Take 1 capsule (0 4 mg total) by mouth daily with dinner 30 capsule 0    VERAPAMIL HCL PO 1 po daily      benzonatate (TESSALON PERLES) 100 mg capsule Take 1 capsule (100 mg total) by mouth 3 (three) times a day as needed for cough 30 capsule 0    valACYclovir (VALTREX) 500 mg tablet Take 1 tablet (500 mg total) by mouth 2 (two) times a day for 5 days 10 tablet 0     No current facility-administered medications for this visit       _______________________________________________________________________  Review of Systems   Constitutional: Positive for weight loss   Negative for chills and fever  HENT: Positive for postnasal drip  Negative for ear pain, rhinorrhea and sore throat  Respiratory: Positive for cough  Negative for hemoptysis, shortness of breath and wheezing  Cardiovascular: Negative for chest pain  Gastrointestinal: Negative for heartburn  Musculoskeletal: Negative for myalgias  Skin: Negative for rash  Neurological: Negative for headaches  Objective:  Vitals:    04/20/21 1103   BP: 122/72   BP Location: Left arm   Patient Position: Sitting   Cuff Size: Adult   Pulse: 76   Resp: 16   Temp: 99 °F (37 2 °C)   TempSrc: Temporal   SpO2: 96%   Weight: 126 kg (278 lb 2 oz)   Height: 6' 2" (1 88 m)     Body mass index is 35 71 kg/m²  Physical Exam  Constitutional:       Appearance: Normal appearance  HENT:      Head: Normocephalic and atraumatic  Right Ear: External ear normal       Left Ear: External ear normal       Nose: Nose normal       Mouth/Throat:      Pharynx: Oropharynx is clear  Eyes:      Extraocular Movements: Extraocular movements intact  Pupils: Pupils are equal, round, and reactive to light  Neck:      Musculoskeletal: Normal range of motion  Cardiovascular:      Rate and Rhythm: Normal rate and regular rhythm  Pulmonary:      Effort: Pulmonary effort is normal       Breath sounds: Normal breath sounds  Neurological:      General: No focal deficit present  Mental Status: He is alert and oriented to person, place, and time  Psychiatric:         Mood and Affect: Mood normal          Behavior: Behavior normal          Thought Content:  Thought content normal          Judgment: Judgment normal

## 2021-05-03 DIAGNOSIS — Z76.0 MEDICATION REFILL: ICD-10-CM

## 2021-05-03 RX ORDER — GABAPENTIN 300 MG/1
CAPSULE ORAL
Qty: 60 CAPSULE | Refills: 0 | Status: SHIPPED | OUTPATIENT
Start: 2021-05-03 | End: 2021-06-04 | Stop reason: SDUPTHER

## 2021-06-04 DIAGNOSIS — Z76.0 MEDICATION REFILL: ICD-10-CM

## 2021-06-04 RX ORDER — GABAPENTIN 300 MG/1
600 CAPSULE ORAL
Qty: 60 CAPSULE | Refills: 6 | Status: SHIPPED | OUTPATIENT
Start: 2021-06-04 | End: 2021-12-27 | Stop reason: SDUPTHER

## 2021-06-04 NOTE — TELEPHONE ENCOUNTER
Needs a refill for Gabapentin 300mg     2 tablets at night        [facility]    Patient ph # [phone]

## 2021-06-22 ENCOUNTER — OFFICE VISIT (OUTPATIENT)
Dept: FAMILY MEDICINE CLINIC | Facility: CLINIC | Age: 66
End: 2021-06-22
Payer: MEDICARE

## 2021-06-22 VITALS
HEART RATE: 71 BPM | TEMPERATURE: 98.3 F | HEIGHT: 74 IN | SYSTOLIC BLOOD PRESSURE: 120 MMHG | DIASTOLIC BLOOD PRESSURE: 70 MMHG | RESPIRATION RATE: 16 BRPM | OXYGEN SATURATION: 94 % | BODY MASS INDEX: 35.34 KG/M2 | WEIGHT: 275.38 LBS

## 2021-06-22 DIAGNOSIS — B02.9 HERPES ZOSTER WITHOUT COMPLICATION: Primary | ICD-10-CM

## 2021-06-22 PROCEDURE — 99214 OFFICE O/P EST MOD 30 MIN: CPT | Performed by: INTERNAL MEDICINE

## 2021-06-22 RX ORDER — VALACYCLOVIR HYDROCHLORIDE 1 G/1
1000 TABLET, FILM COATED ORAL 3 TIMES DAILY
Qty: 21 TABLET | Refills: 0 | Status: SHIPPED | OUTPATIENT
Start: 2021-06-22 | End: 2021-07-26

## 2021-06-22 RX ORDER — PREDNISONE 20 MG/1
TABLET ORAL
Qty: 9 TABLET | Refills: 0 | Status: SHIPPED | OUTPATIENT
Start: 2021-06-22 | End: 2021-07-26

## 2021-06-22 RX ORDER — LIDOCAINE 40 MG/G
CREAM TOPICAL 2 TIMES DAILY
Qty: 30 G | Refills: 1 | Status: SHIPPED | OUTPATIENT
Start: 2021-06-22

## 2021-06-22 NOTE — PROGRESS NOTES
Assessment/Plan:         Problem List Items Addressed This Visit        Other    Herpes zoster without complication - Primary     Will treat with Valtrex, topical lidocaine cream, and prednisone taper to hopefully cut back on incidence of post herpetic neuralgia-had long discussion about Christina Granados getting the Shingrix vaccine as well-he will check with his insurance company as to where he should get it-also discussed using some prn motrin/tylenol         Relevant Medications    predniSONE 20 mg tablet    lidocaine (LMX) 4 % cream    valACYclovir (VALTREX) 1,000 mg tablet            Subjective:      Patient ID: Sam Child is a 78 y o  male  Haitian Pong here because he's had a tender rash for several days now on the right side of his chest-no fever, but rash is tender, especially up under his arm-rash is obviously shingles on exam      The following portions of the patient's history were reviewed and updated as appropriate:   Past Medical History:  He has a past medical history of Alkaline phosphatase raised, Arthralgia of foot, Arthritis, Aspirin allergy, Asthma, Chronic back pain, Cobalamin deficiency, Displacement of cervical intervertebral disc without myelopathy, Edema of lower extremity, Gallstone, GERD (gastroesophageal reflux disease), Heart murmur, Hepatitis B, History of diabetes mellitus, History of echocardiogram (02/23/2017), History of EKG (09/15/2017), History of stress test (03/2017), Hypertension, Hypoglycemia, Hypothyroidism, Increased body mass index, Induration penis plastica, Mononeuritis, Neuropathy, Non-alcoholic fatty liver disease, Obesity, Osteopenia, Osteoporosis, Palpitations, Pituitary adenoma ([address] ), Pneumonia, Sebaceous cyst, Testicular hypofunction, Thrombocytopenic disorder ([facility] ), Tremor, and Vitamin D deficiency  ,  _______________________________________________________________________  Medical Problems:  does not have any pertinent problems on file ,  _______________________________________________________________________  Past Surgical History:   has a past surgical history that includes Lipoma resection (Right, 2014); Lumbar fusion (2010); Toenail excision (Left, 1993); Laminectomy; Mole removal; Hernia repair (6596); EGD (07/15/2019); EGD (04/03/2018); Back surgery (2010); Cyst Removal (1987); and Cologuard (Historical) (04/01/2019)  ,  _______________________________________________________________________  Family History:  family history includes Aortic stenosis in his father; Atrial fibrillation in his father; Coronary artery disease in his father; Dementia in his father; Heart disease in his father; Other in his father; Stroke in his father ,  _______________________________________________________________________  Social History:   reports that he has never smoked  He has never used smokeless tobacco  He reports previous alcohol use  He reports previous drug use ,  _______________________________________________________________________  Allergies:  is allergic to carvedilol, metoprolol, aspirin, ciprofloxacin, clindamycin, propranolol, and pollen extract     _______________________________________________________________________  Current Outpatient Medications   Medication Sig Dispense Refill    acetaminophen (TYLENOL) 650 mg CR tablet Take 650 mg by mouth every 8 (eight) hours as needed      ACETAMINOPHEN-CODEINE PO 1 po every 8 hours prn      albuterol (PROVENTIL HFA,VENTOLIN HFA) 90 mcg/act inhaler Inhale 2 puffs every 6 (six) hours as needed for wheezing or shortness of breath 1 Inhaler 5    Biotin 98575 MCG TABS       Calcium Carb-Cholecalciferol (CALCIUM/VITAMIN D PO)       Cholecalciferol (Vitamin D3) 50 MCG (2000 UT) TABS Take 2,000 Units by mouth daily      finasteride (PROSCAR) 5 mg tablet Take 5 mg by mouth 2 (two) times a day       gabapentin (NEURONTIN) 300 mg capsule Take 2 capsules (600 mg total) by mouth daily at bedtime 60 capsule 6    Ginger, Zingiber officinalis, (Ginger Root) 500 MG CAPS Take 1 capsule by mouth 3 (three) times a day      Lactobacillus (ACIDOPHILUS PO) Take 1 tablet by mouth 2 (two) times a day      nystatin (MYCOSTATIN) powder Apply 1 application topically 2 (two) times a day as needed      omeprazole (PriLOSEC) 20 mg delayed release capsule Take 20 mg by mouth daily      Polyethyl Glycol-Propyl Glycol (SYSTANE OP)       primidone (MYSOLINE) 50 mg tablet Take 150 mg by mouth 2 (two) times a day      tadalafil (Cialis) 20 MG tablet Take 20 mg by mouth      tamsulosin (FLOMAX) 0 4 mg Take 1 capsule (0 4 mg total) by mouth daily with dinner 30 capsule 0    VERAPAMIL HCL PO 1 po daily      benzonatate (TESSALON PERLES) 100 mg capsule Take 1 capsule (100 mg total) by mouth 3 (three) times a day as needed for cough (Patient not taking: Reported on 6/22/2021) 30 capsule 0    doxycycline hyclate (VIBRAMYCIN) 100 mg capsule Take 100 mg by mouth every 12 (twelve) hours (Patient not taking: Reported on 6/22/2021)      lidocaine (LMX) 4 % cream Apply topically 2 (two) times a day 30 g 1    predniSONE 20 mg tablet Take 1 tablet daily for 4 days, then 1/2 tablet daily for 5 days 9 tablet 0    valACYclovir (VALTREX) 1,000 mg tablet Take 1 tablet (1,000 mg total) by mouth 3 (three) times a day for 7 days 21 tablet 0    valACYclovir (VALTREX) 500 mg tablet Take 1 tablet (500 mg total) by mouth 2 (two) times a day for 5 days (Patient not taking: Reported on 6/22/2021) 10 tablet 0     No current facility-administered medications for this visit      _______________________________________________________________________  Review of Systems   Constitutional: Negative for fatigue and fever  Respiratory: Negative for shortness of breath  Cardiovascular: Negative for chest pain and leg swelling  Skin: Positive for rash  Neurological: Negative for facial asymmetry and headaches           Objective:  Vitals: 06/22/21 1137   BP: 120/70   BP Location: Left arm   Patient Position: Sitting   Cuff Size: Adult   Pulse: 71   Resp: 16   Temp: 98 3 °F (36 8 °C)   TempSrc: Temporal   SpO2: 94%   Weight: 125 kg (275 lb 6 oz)   Height: 6' 2" (1 88 m)     Body mass index is 35 36 kg/m²  Physical Exam  Constitutional:       Appearance: Normal appearance  HENT:      Head: Normocephalic and atraumatic  Right Ear: External ear normal       Left Ear: External ear normal    Pulmonary:      Effort: Pulmonary effort is normal    Skin:     Findings: Rash present  Comments: Erythematous, blister like rash right chest wall, arm, wraps around to front-shingles   Neurological:      General: No focal deficit present  Mental Status: He is alert and oriented to person, place, and time  Psychiatric:         Mood and Affect: Mood normal          Behavior: Behavior normal          Thought Content:  Thought content normal

## 2021-06-22 NOTE — ASSESSMENT & PLAN NOTE
Will treat with Valtrex, topical lidocaine cream, and prednisone taper to hopefully cut back on incidence of post herpetic neuralgia-had long discussion about Jorge Hewitt getting the Shingrix vaccine as well-he will check with his insurance company as to where he should get it-also discussed using some prn motrin/tylenol

## 2021-07-28 ENCOUNTER — OFFICE VISIT (OUTPATIENT)
Dept: FAMILY MEDICINE CLINIC | Facility: CLINIC | Age: 66
End: 2021-07-28
Payer: MEDICARE

## 2021-07-28 VITALS
RESPIRATION RATE: 16 BRPM | DIASTOLIC BLOOD PRESSURE: 80 MMHG | HEIGHT: 74 IN | OXYGEN SATURATION: 95 % | SYSTOLIC BLOOD PRESSURE: 132 MMHG | BODY MASS INDEX: 35.49 KG/M2 | WEIGHT: 276.5 LBS | HEART RATE: 62 BPM | TEMPERATURE: 98.5 F

## 2021-07-28 DIAGNOSIS — Z13.31 POSITIVE DEPRESSION SCREENING: ICD-10-CM

## 2021-07-28 DIAGNOSIS — Z92.29 COVID-19 VACCINE SERIES COMPLETED: ICD-10-CM

## 2021-07-28 DIAGNOSIS — Z00.00 MEDICARE ANNUAL WELLNESS VISIT, SUBSEQUENT: Primary | ICD-10-CM

## 2021-07-28 DIAGNOSIS — L91.8 SKIN TAG: ICD-10-CM

## 2021-07-28 DIAGNOSIS — F32.A MILD DEPRESSION: ICD-10-CM

## 2021-07-28 PROCEDURE — G0439 PPPS, SUBSEQ VISIT: HCPCS | Performed by: INTERNAL MEDICINE

## 2021-07-28 PROCEDURE — 11200 RMVL SKIN TAGS UP TO&INC 15: CPT | Performed by: INTERNAL MEDICINE

## 2021-07-28 PROCEDURE — 1123F ACP DISCUSS/DSCN MKR DOCD: CPT | Performed by: INTERNAL MEDICINE

## 2021-07-28 NOTE — PROGRESS NOTES
Skin tag removal    Date/Time: 7/28/2021 1:39 PM  Performed by: Marito Couch MD  Authorized by: Marito Couch MD   Universal Protocol:  Consent: Verbal consent obtained  Risks and benefits: risks, benefits and alternatives were discussed  Consent given by: patient  Patient understanding: patient states understanding of the procedure being performed  Procedure consent: procedure consent matches procedure scheduled  Patient identity confirmed: verbally with patient        Procedure Details - Skin Tag Destruction:     Up to 15      Body area:  [address]    Head/neck location:  L cheek    Malignancy: benign lesion      Destruction method: scissors used for extraction    Lesion 6:      {Number Addl Lesion:[id number]}0        Answers for HPI/ROS submitted by the patient on 7/25/2021  How would you rate your overall health?: poor  Compared to last year, how is your physical health?: slightly worse  In general, how satisfied are you with your life?: satisfied  Compared to last year, how is your eyesight?: same  Compared to last year, how is your hearing?: slightly worse  Compared to last year, how is your emotional/mental health?: slightly worse  How often is anger a problem for you?: never, rarely  How often do you feel unusually tired/fatigued?: sometimes  In the past 7 days, how much pain have you experienced?: a lot  If you answered "some" or "a lot", please rate the severity of your pain on a scale of 1 to 10 (1 being the least severe pain and 10 being the most intense pain)  : 7/10  In the past 6 months, have you lost or gained 10 pounds without trying?: Yes  One or more falls in the last year: No  Do you have trouble with the stairs inside or outside your home?: No  Does your home have working smoke alarms?: Yes  Does your home have a carbon monoxide monitor?: Yes  Which safety hazards (if any) have you experienced in your home? Please select all that apply : none  How would you describe your current diet?  Please select all that apply : Regular  In addition to prescription medications, are you taking any over-the-counter supplements?: Yes  If yes, what supplements are you taking?: see list  Can you manage your medications?: Yes  Are you currently taking any opioid medications?: No  Can you walk and transfer into and out of your bed and chair?: Yes  Can you dress and groom yourself?: Yes  Can you bathe or shower yourself?: Yes  Can you feed yourself?: Yes  Can you do your laundry/ housekeeping?: Yes  Can you manage your money, pay your bills, and track your expenses?: Yes  Can you make your own meals?: Yes  Can you do your own shopping?: Yes  Please list your DME (Durable Medical Equipment) supplier, if you use one : none  Within the last 12 months, have you had any hospitalizations or Emergency Department visits?: Yes  If yes, how many times have you been hospitalized within the past year?: 1-2  Additional Comments: gall bladder pain  Do you have a living will?: Yes  Do you have a Durable POA (Power of ) for healthcare decisions?: Yes  Do you have an Advanced Directive for end of life decisions?: Yes  How often have you used an illegal drug (including marijuana) or a prescription medication for non-medical reasons in the past year?: never  What is the typical number of drinks you consume in a day?: 0  What is the typical number of drinks you consume in a week?: 0  How often did you have a drink containing alcohol in the past year?: never  How many drinks did you have on a typical day  when you were drinking in the past year?: never  How often did you have 6 or more drinks on one occasion in the past year?: never

## 2021-07-28 NOTE — PROGRESS NOTES
Skin tag removal    Date/Time: 7/28/2021 1:43 PM  Performed by: Carlos Maldonado MD  Authorized by: Carlos Maldonado MD   Universal Protocol:  Procedure performed by:  Consent: Verbal consent obtained    Consent given by: patient  Patient understanding: patient states understanding of the procedure being performed  Patient consent: the patient's understanding of the procedure matches consent given  Patient identity confirmed: verbally with patient        Procedure Details - Skin Tag Destruction:     Up to 15      Body area:  Head/neck    Head/neck location:  L cheek    Malignancy: benign lesion      Destruction method: scissors used for extraction    Lesion 6:      1

## 2021-07-28 NOTE — PATIENT INSTRUCTIONS
Medicare Preventive Visit Patient Instructions  Thank you for completing your Welcome to Medicare Visit or Medicare Annual Wellness Visit today  Your next wellness visit will be due in one year (7/29/2022)  The screening/preventive services that you may require over the next 5-10 years are detailed below  Some tests may not apply to you based off risk factors and/or age  Screening tests ordered at today's visit but not completed yet may show as past due  Also, please note that scanned in results may not display below  Preventive Screenings:  Service Recommendations Previous Testing/Comments   Colorectal Cancer Screening  · Colonoscopy    · Fecal Occult Blood Test (FOBT)/Fecal Immunochemical Test (FIT)  · Fecal DNA/Cologuard Test  · Flexible Sigmoidoscopy Age: 54-65 years old   Colonoscopy: every 10 years (May be performed more frequently if at higher risk)  OR  FOBT/FIT: every 1 year  OR  Cologuard: every 3 years  OR  Sigmoidoscopy: every 5 years  Screening may be recommended earlier than age 48 if at higher risk for colorectal cancer  Also, an individualized decision between you and your healthcare provider will decide whether screening between the ages of 74-80 would be appropriate   Colonoscopy: Not on file  FOBT/FIT: Not on file  Cologuard: 04/01/2019  Sigmoidoscopy: Not on file          Prostate Cancer Screening Individualized decision between patient and health care provider in men between ages of 53-78   Medicare will cover every 12 months beginning on the day after your 50th birthday PSA: No results in last 5 years           Hepatitis C Screening Once for adults born between 1945 and 1965  More frequently in patients at high risk for Hepatitis C Hep C Antibody: 03/16/2018        Diabetes Screening 1-2 times per year if you're at risk for diabetes or have pre-diabetes Fasting glucose: No results in last 5 years   A1C: No results in last 5 years        Cholesterol Screening Once every 5 years if you don't have a lipid disorder  May order more often based on risk factors  Lipid panel: 03/25/2019           Other Preventive Screenings Covered by Medicare:  1  Abdominal Aortic Aneurysm (AAA) Screening: covered once if your at risk  You're considered to be at risk if you have a family history of AAA or a male between the age of 73-68 who smoking at least 100 cigarettes in your lifetime  2  Lung Cancer Screening: covers low dose CT scan once per year if you meet all of the following conditions: (1) Age 50-69; (2) No signs or symptoms of lung cancer; (3) Current smoker or have quit smoking within the last 15 years; (4) You have a tobacco smoking history of at least 30 pack years (packs per day x number of years you smoked); (5) You get a written order from a healthcare provider  3  Glaucoma Screening: covered annually if you're considered high risk: (1) You have diabetes OR (2) Family history of glaucoma OR (3)  aged 48 and older OR (3)  American aged 72 and older  3  Osteoporosis Screening: covered every 2 years if you meet one of the following conditions: (1) Have a vertebral abnormality; (2) On glucocorticoid therapy for more than 3 months; (3) Have primary hyperparathyroidism; (4) On osteoporosis medications and need to assess response to drug therapy  5  HIV Screening: covered annually if you're between the age of 12-76  Also covered annually if you are younger than 13 and older than 72 with risk factors for HIV infection  For pregnant patients, it is covered up to 3 times per pregnancy      Immunizations:  Immunization Recommendations   Influenza Vaccine Annual influenza vaccination during flu season is recommended for all persons aged >= 6 months who do not have contraindications   Pneumococcal Vaccine (Prevnar and Pneumovax)  * Prevnar = PCV13  * Pneumovax = PPSV23 Adults 25-60 years old: 1-3 doses may be recommended based on certain risk factors  Adults 72 years old: Prevnar (PCV13) vaccine recommended followed by Pneumovax (PPSV23) vaccine  If already received PPSV23 since turning 65, then PCV13 recommended at least one year after PPSV23 dose  Hepatitis B Vaccine 3 dose series if at intermediate or high risk (ex: diabetes, end stage renal disease, liver disease)   Tetanus (Td) Vaccine - COST NOT COVERED BY MEDICARE PART B Following completion of primary series, a booster dose should be given every 10 years to maintain immunity against tetanus  Td may also be given as tetanus wound prophylaxis  Tdap Vaccine - COST NOT COVERED BY MEDICARE PART B Recommended at least once for all adults  For pregnant patients, recommended with each pregnancy  Shingles Vaccine (Shingrix) - COST NOT COVERED BY MEDICARE PART B  2 shot series recommended in those aged 48 and above     Health Maintenance Due:      Topic Date Due    Colorectal Cancer Screening  04/01/2022    Hepatitis C Screening  Completed     Immunizations Due:      Topic Date Due    Influenza Vaccine (1) 09/01/2021     Advance Directives   What are advance directives? Advance directives are legal documents that state your wishes and plans for medical care  These plans are made ahead of time in case you lose your ability to make decisions for yourself  Advance directives can apply to any medical decision, such as the treatments you want, and if you want to donate organs  What are the types of advance directives? There are many types of advance directives, and each state has rules about how to use them  You may choose a combination of any of the following:  · Living will: This is a written record of the treatment you want  You can also choose which treatments you do not want, which to limit, and which to stop at a certain time  This includes surgery, medicine, IV fluid, and tube feedings  · Durable power of  for healthcare [facility]):   This is a written record that states who you want to make healthcare choices for you when you are unable to make them for yourself  This person, called a proxy, is usually a family member or a friend  You may choose more than 1 proxy  · Do not resuscitate (DNR) order:  A DNR order is used in case your heart stops beating or you stop breathing  It is a request not to have certain forms of treatment, such as CPR  A DNR order may be included in other types of advance directives  · Medical directive: This covers the care that you want if you are in a coma, near death, or unable to make decisions for yourself  You can list the treatments you want for each condition  Treatment may include pain medicine, surgery, blood transfusions, dialysis, IV or tube feedings, and a ventilator (breathing machine)  · Values history: This document has questions about your views, beliefs, and how you feel and think about life  This information can help others choose the care that you would choose  Why are advance directives important? An advance directive helps you control your care  Although spoken wishes may be used, it is better to have your wishes written down  Spoken wishes can be misunderstood, or not followed  Treatments may be given even if you do not want them  An advance directive may make it easier for your family to make difficult choices about your care  Weight Management   Why it is important to manage your weight:  Being overweight increases your risk of health conditions such as heart disease, high blood pressure, type 2 diabetes, and certain types of cancer  It can also increase your risk for osteoarthritis, sleep apnea, and other respiratory problems  Aim for a slow, steady weight loss  Even a small amount of weight loss can lower your risk of health problems  How to lose weight safely:  A safe and healthy way to lose weight is to eat fewer calories and get regular exercise  You can lose up about 1 pound a week by decreasing the number of calories you eat by 500 calories each day     Healthy meal plan for weight management:  A healthy meal plan includes a variety of foods, contains fewer calories, and helps you stay healthy  A healthy meal plan includes the following:  · Eat whole-grain foods more often  A healthy meal plan should contain fiber  Fiber is the part of grains, fruits, and vegetables that is not broken down by your body  Whole-grain foods are healthy and provide extra fiber in your diet  Some examples of whole-grain foods are whole-wheat breads and pastas, oatmeal, brown rice, and bulgur  · Eat a variety of vegetables every day  Include dark, leafy greens such as spinach, kale, johny greens, and mustard greens  Eat yellow and orange vegetables such as carrots, sweet potatoes, and winter squash  · Eat a variety of fruits every day  Choose fresh or canned fruit (canned in its own juice or light syrup) instead of juice  Fruit juice has very little or no fiber  · Eat low-fat dairy foods  Drink fat-free (skim) milk or 1% milk  Eat fat-free yogurt and low-fat cottage cheese  Try low-fat cheeses such as mozzarella and other reduced-fat cheeses  · Choose meat and other protein foods that are low in fat  Choose beans or other legumes such as split peas or lentils  Choose fish, skinless poultry (chicken or turkey), or lean cuts of red meat (beef or pork)  Before you cook meat or poultry, cut off any visible fat  · Use less fat and oil  Try baking foods instead of frying them  Add less fat, such as margarine, sour cream, regular salad dressing and mayonnaise to foods  Eat fewer high-fat foods  Some examples of high-fat foods include french fries, doughnuts, ice cream, and cakes  · Eat fewer sweets  Limit foods and drinks that are high in sugar  This includes candy, cookies, regular soda, and sweetened drinks  Exercise:  Exercise at least 30 minutes per day on most days of the week  Some examples of exercise include walking, biking, dancing, and swimming   You can also fit in more physical activity by taking the stairs instead of the elevator or parking farther away from stores  Ask your healthcare provider about the best exercise plan for you  © Copyright [employer] 2018 Information is for End User's use only and may not be sold, redistributed or otherwise used for commercial purposes  All illustrations and images included in CareNotes® are the copyrighted property of A D A M , Inc  or [facility] [employer] Denney   Depression   AMBULATORY CARE:   Depression  is a medical condition that causes feelings of sadness or hopelessness that do not go away  Depression may cause you to lose interest in things you used to enjoy  These feelings may interfere with your daily life  Common symptoms include the following:   · Appetite changes, or weight gain or loss    · Trouble going to sleep or staying asleep, or sleeping too much    · Fatigue or lack of energy    · Feeling restless, irritable, or withdrawn    · Feeling worthless, hopeless, discouraged, or guilty    · Trouble concentrating, remembering things, doing daily tasks, or making decisions    · Thoughts about hurting or killing yourself    Call your local emergency number (911 in the [address] [place] [address],[address]) if:   · You think about harming yourself or someone else  · You have done something on purpose to hurt yourself  Call your therapist or doctor if:   · Your symptoms do not improve  · You cannot make it to your next appointment  · You have new symptoms  · You have questions or concerns about your condition or care  The following resources are available at any time to help you, if needed:   · [address] [facility]: 3-286.345.8179 (2-120-322-VVOE)     · Suicide Hotline: 3-605.284.2763 (4-049-ISPOTBT)     · For a list of international numbers: https://save org/find-help/international-resources/    Treatment for depression  may include medicine to relieve depression  Medicine is often used together with therapy   Therapy is a way for you to talk about your feelings and anything that may be causing depression  Therapy can be done alone or in a group  It may also be done with family members or a significant other  Self-care:   · Get regular physical activity  Try to be active for 30 minutes, 3 to 5 days a week  Physical activity can help relieve depression  Work with your healthcare provider to develop a plan that you enjoy  It may help to ask someone to be active with you  · Create a regular sleep schedule  A routine can help you relax before bed  Listen to music, read, or do yoga  Try to go to bed and wake up at the same time every day  Sleep is important for emotional health  · Eat a variety of healthy foods  Healthy foods include fruits, vegetables, whole-grain breads, low-fat dairy products, lean meats, fish, and cooked beans  A healthy meal plan is low in fat, salt, and added sugar  · Do not drink alcohol or use drugs  Alcohol and drugs can make depression worse  Talk to your therapist or doctor if you need help quitting  Follow up with your healthcare provider as directed: Your healthcare provider will monitor your progress at follow-up visits  He or she will also monitor your medicine if you take antidepressants  Your healthcare provider will ask if the medicine is helping  Tell him or her about any side effects or problems you may have with your medicine  The type or amount of medicine may need to be changed  Write down your questions so you remember to ask them during your visits  © Copyright [employer] 2021 Information is for End User's use only and may not be sold, redistributed or otherwise used for commercial purposes  All illustrations and images included in CareNotes® are the copyrighted property of [employer] M [employer] Inc  or [facility] Adelfo Denney   The above information is an  only  It is not intended as medical advice for individual conditions or treatments   Talk to your doctor, nurse or pharmacist before following any medical regimen to see if it is safe and effective for you  No st elevations or depressions.  Grossly normal t wave morphology.

## 2021-07-28 NOTE — PROGRESS NOTES
Assessment and Plan:     Problem List Items Addressed This Visit     None      Visit Diagnoses     Medicare annual wellness visit, subsequent    -  Primary    BMI 35 0-35 9,adult        Mild depression ([facility] )        Positive depression screening        COVID-19 vaccine series completed              Depression Screening and Follow-up Plan: Patient's depression screening was positive with a PHQ-2 score of 3  Their PHQ-9 score was 7  Clincally patient does not have depression  No treatment is required  Preventive health issues were discussed with patient, and age appropriate screening tests were ordered as noted in patient's After Visit Summary  Personalized health advice and appropriate referrals for health education or preventive services given if needed, as noted in patient's After Visit Summary       History of Present Illness:     Patient presents for Medicare Annual Wellness visit    Patient Care Team:  Sly Troy MD as PCP - General (Internal Medicine)     Problem List:     Patient Active Problem List   Diagnosis    Left bundle branch block    Pituitary mass ([facility] )    H/O spinal fusion    Atrial tachycardia ([facility] )    Left ventricular hypertrophy    Chronic back pain    Neck pain    Intestinal metaplasia of gastric cardia    History of colonic polyps    Asthma    Fatty liver    LVH (left ventricular hypertrophy)    S/P laminectomy    Splenomegaly    Thrombocytopenia (HCC)    Right shoulder pain    Nocturia    Essential tremor    Erectile dysfunction    DDD (degenerative disc disease), cervical    Impingement syndrome of right shoulder    Cervical radiculopathy    BPH (benign prostatic hyperplasia)    PAD (peripheral artery disease) ([facility] )    Dysuria    Herpes zoster without complication      Past Medical and Surgical History:     Past Medical History:   Diagnosis Date    Alkaline phosphatase raised     Arthralgia of foot     arthralgia of the ankle and/or foot    Arthritis  Aspirin allergy     Asthma     Chronic back pain     Cobalamin deficiency     Displacement of cervical intervertebral disc without myelopathy     Edema of lower extremity     Gallstone     GERD (gastroesophageal reflux disease)     Heart murmur     Hepatitis B     History of diabetes mellitus     History of echocardiogram 02/23/2017    Nondilated LV with well-preserved systolic function and estimated EF of 50%  Mild septal dyskinesis was noted with otherwise normal wall motion  Moderate to severe LVH was noted with evidence of grade I diastolic dysfunction  Aortic valve sclerosis and mitral annular calcifcation were present with no evidence of significant valvular stenosis  Color Doppler examination as remarkable for trace MR, 1    History of EKG 09/15/2017    Sinus rhythm with 1st degree AV block, Left bundle branch block, Abnormal ECG  EKG 5/25/17-Sinus rhythm with 1st degree AV block, Sinus rhythm with occasional premature ventricular complexes and fusion complexes  Cannot rule out Anteroseptal infarct, age undertermind  T wave abnormality, consider lateral ischemia abnormal ECG  EKG 3/24/17-Sinus rhythm with 1st degree AV block with occasional giorgio    History of stress test 03/2017     Mildly abnormal study with evidence of a fixed inferoseptal defect  This is most likely related to attenuation artifact, however, the possibility of a nontransmural myocardial infarction involving these segments cannot be completely excluded  Quantitative analysis indicates equivocal ischemia in the mid segment of the septum  This is most likely related to artifacts   Gated wall motion analysis wa    Hypertension     Hypoglycemia     Hypothyroidism     Increased body mass index     Induration penis plastica     Mononeuritis     Neuropathy     Non-alcoholic fatty liver disease     Obesity     Osteopenia     Osteoporosis     Palpitations     Pituitary adenoma (HCC)     Pneumonia     Sebaceous cyst infection of sebaceous cyst    Testicular hypofunction     Thrombocytopenic disorder ([facility] )     Tremor     Vitamin D deficiency      Past Surgical History:   Procedure Laterality Date    BACK SURGERY  2010- Fusion L4-L5-S1    COLOGUARD (HISTORICAL)  2019    Negative    CYST REMOVAL  1987- Sebaceous cyst removed from back    EGD  07/15/2019    EGD  2018    HERNIA REPAIR      LAMINECTOMY      Laminectomy with internal fixation L4-S1    LIPOMA RESECTION Right 2014    abdomen    LUMBAR FUSION      L4-L5-S1    MOLE REMOVAL      back and face    TOENAIL EXCISION Left 1993      Family History:     Family History   Problem Relation Age of Onset    Atrial fibrillation Father     Dementia Father     Heart disease Father         pacemaker    Stroke Father     Coronary artery disease Father     Aortic stenosis Father     Other Father         muscle atrophy      Social History:     Social History     Socioeconomic History    Marital status:      Spouse name: None    Number of children: None    Years of education: None    Highest education level: None   Occupational History    Occupation: Retired - Transportation   Tobacco Use    Smoking status: Never Smoker    Smokeless tobacco: Never Used   Vaping Use    Vaping Use: Never used   Substance and Sexual Activity    Alcohol use: Not Currently    Drug use: Not Currently     Comment: No use    Sexual activity: None   Other Topics Concern    None   Social History Narrative    · Most recent tobacco use screenin2019      · Do you currently or have you served in the [employer]:   No      · Were you activated, into active duty, as a member of the [employer] or as a Reservist:   No      · Marital status:   Single  ()     · Exercise level:   None      · Diet:   Regular      · General stress level:   Medium      · Alcohol intake:   Occasional  1-2 a month     · Caffeine intake:    Moderate 1-2 cups a day     · Guns present in home:   No      · Seat belts used routinely:   Yes      · Sunscreen used routinely:   Yes      · Advance directive: Yes      · Live alone or with others:   with others  Roommate      · Smoke alarm in home: Yes      · Are there stairs in your home: Yes      · Pets:   Yes  - Cats          Social Determinants of Health     Financial Resource Strain:     Difficulty of Paying Living Expenses:    Food Insecurity:     Worried About Running Out of Food in the Last Year:     [address] in the Last Year:    Transportation Needs:     Lack of Transportation (Medical):      Lack of Transportation (Non-Medical):    Physical Activity:     Days of Exercise per Week:     Minutes of Exercise per Session:    Stress:     Feeling of Stress :    Social Connections:     Frequency of Communication with Friends and Family:     Frequency of Social Gatherings with Friends and Family:     Attends [employer] Services:     Active Member of Clubs or Organizations:     Attends Club or Organization Meetings:     Marital Status:    Intimate Partner Violence:     Fear of Current or Ex-Partner:     Emotionally Abused:     Physically Abused:     Sexually Abused:       Medications and Allergies:     Current Outpatient Medications   Medication Sig Dispense Refill    acetaminophen (TYLENOL) 650 mg CR tablet Take 650 mg by mouth every 8 (eight) hours as needed      Biotin 82910 MCG TABS       Calcium Carb-Cholecalciferol (CALCIUM/VITAMIN D PO)       Cholecalciferol (Vitamin D3) 50 MCG (2000 UT) TABS Take 2,000 Units by mouth daily      finasteride (PROSCAR) 5 mg tablet Take 5 mg by mouth 2 (two) times a day       gabapentin (NEURONTIN) 300 mg capsule Take 2 capsules (600 mg total) by mouth daily at bedtime 60 capsule 6    Lactobacillus (ACIDOPHILUS PO) Take 1 tablet by mouth 2 (two) times a day      lidocaine (LMX) 4 % cream Apply topically 2 (two) times a day 30 g 1    omeprazole (PriLOSEC) 20 mg delayed release capsule Take 20 mg by mouth daily      Polyethyl Glycol-Propyl Glycol (SYSTANE OP)       primidone (MYSOLINE) 50 mg tablet Take 150 mg by mouth 2 (two) times a day      tadalafil (Cialis) 20 MG tablet Take 20 mg by mouth      tamsulosin (FLOMAX) 0 4 mg Take 1 capsule (0 4 mg total) by mouth daily with dinner 30 capsule 0    VERAPAMIL HCL PO 1 po daily      Anita, Zingiber officinalis, (Anita Root) 500 MG CAPS Take 1 capsule by mouth 3 (three) times a day (Patient not taking: Reported on 7/28/2021)       No current facility-administered medications for this visit       Allergies   Allergen Reactions    Carvedilol Shortness Of Breath    Metoprolol Shortness Of Breath and Fatigue    Aspirin GI Intolerance    Ciprofloxacin Other (See Comments)     irregular heart rate    Clindamycin Other (See Comments)     diarrhea    Propranolol Other (See Comments)     Dizzy, lathargic    Pollen Extract Other (See Comments)     Itchy eyes, runny nose, scratchy throat      Immunizations:     Immunization History   Administered Date(s) Administered    INFLUENZA 09/01/2016, 09/29/2016, 10/02/2017, 09/19/2018, 09/07/2020    Influenza, injectable, quadrivalent, preservative free 0 5 mL 10/03/2018    Pneumococcal Conjugate 13-Valent 03/22/2019    Pneumococcal Polysaccharide PPV23 10/20/2014    SARS-CoV-2 / COVID-19 mRNA IM (Pfizer-BioNTech) 03/22/2021, 04/14/2021    Tdap 05/21/2017    influenza, trivalent, adjuvanted 09/07/2019      Health Maintenance:         Topic Date Due    Colorectal Cancer Screening  04/01/2022    Hepatitis C Screening  Completed         Topic Date Due    Influenza Vaccine (1) 09/01/2021      Medicare Health Risk Assessment:     /80 (BP Location: Left arm, Patient Position: Sitting, Cuff Size: Adult)   Pulse 62   Temp 98 5 °F (36 9 °C) (Temporal)   Resp 16   Ht 6' 2" (1 88 m)   Wt 125 kg (276 lb 8 oz)   SpO2 95%   BMI 35 50 kg/m²      Elizabeth Fishman is here for his Subsequent Wellness visit  Last Medicare Wellness visit information reviewed, patient interviewed and updates made to the record today  Health Risk Assessment:   Patient rates overall health as poor  Patient feels that their physical health rating is slightly worse  Patient is satisfied with their life  Eyesight was rated as same  Hearing was rated as slightly worse  Patient feels that their emotional and mental health rating is slightly worse  Patients states they are never, rarely angry  Patient states they are sometimes unusually tired/fatigued  Pain experienced in the last 7 days has been a lot  Patient's pain rating has been 7/10  Patient states that he has experienced weight loss or gain in last 6 months  Depression Screening:   PHQ-2 Score: 3  PHQ-9 Score: 7      Fall Risk Screening: In the past year, patient has experienced: no history of falling in past year      Home Safety:  Patient does not have trouble with stairs inside or outside of their home  Patient has working smoke alarms and has working carbon monoxide detector  Home safety hazards include: none  Nutrition:   Current diet is Regular  Medications:   Patient is currently taking over-the-counter supplements  OTC medications include: see list  Patient is able to manage medications  Activities of Daily Living (ADLs)/Instrumental Activities of Daily Living (IADLs):   Walk and transfer into and out of bed and chair?: Yes  Dress and groom yourself?: Yes    Bathe or shower yourself?: Yes    Feed yourself?  Yes  Do your laundry/housekeeping?: Yes  Manage your money, pay your bills and track your expenses?: Yes  Make your own meals?: Yes    Do your own shopping?: Yes    Durable Medical Equipment Suppliers  none    Previous Hospitalizations:   Any hospitalizations or ED visits within the last 12 months?: Yes    How many hospitalizations have you had in the last year?: 1-2    Hospitalization Comments: gall bladder

## 2021-09-03 ENCOUNTER — OFFICE VISIT (OUTPATIENT)
Dept: FAMILY MEDICINE CLINIC | Facility: CLINIC | Age: 66
End: 2021-09-03
Payer: MEDICARE

## 2021-09-03 VITALS
SYSTOLIC BLOOD PRESSURE: 122 MMHG | HEIGHT: 74 IN | HEART RATE: 63 BPM | OXYGEN SATURATION: 95 % | TEMPERATURE: 98.4 F | WEIGHT: 271.25 LBS | RESPIRATION RATE: 16 BRPM | DIASTOLIC BLOOD PRESSURE: 74 MMHG | BODY MASS INDEX: 34.81 KG/M2

## 2021-09-03 DIAGNOSIS — H61.91 SKIN LESION OF RIGHT EAR: Primary | ICD-10-CM

## 2021-09-03 PROCEDURE — 99213 OFFICE O/P EST LOW 20 MIN: CPT | Performed by: INTERNAL MEDICINE

## 2021-09-03 RX ORDER — TADALAFIL 5 MG/1
TABLET ORAL
COMMUNITY
Start: 2021-08-18

## 2021-09-03 RX ORDER — FAMOTIDINE 10 MG
10 TABLET ORAL
COMMUNITY

## 2021-09-03 NOTE — PROGRESS NOTES
Procedures  Assessment/Plan:         Problem List Items Addressed This Visit     None      Visit Diagnoses     Skin lesion of right ear    -  Primary    Likely a basal  cell ca-Cedric would like to see his Gen Surg Dr Mulu Marc about this and said he would call him to have it removed/biopsied    BMI 34 0-34 9,adult                Subjective:      Patient ID: Toney Tejeda is a 78 y o  male  Dillon Roots here to have me look at a lesion on his right ear that his  noticed and brought to his attention  He also updated me on a few things: he's now on daily Cialis for BPH, he had to stop the keto diet he was trying because of lack of energy, and he wanted to let me know he was ill last week-seems better now      The following portions of the patient's history were reviewed and updated as appropriate:   Past Medical History:  He has a past medical history of Alkaline phosphatase raised, Arthralgia of foot, Arthritis, Aspirin allergy, Asthma, Chronic back pain, Cobalamin deficiency, Displacement of cervical intervertebral disc without myelopathy, Edema of lower extremity, Gallstone, GERD (gastroesophageal reflux disease), Heart murmur, Hepatitis B, History of diabetes mellitus, History of echocardiogram (02/23/2017), History of EKG (09/15/2017), History of stress test (03/2017), Hypertension, Hypoglycemia, Hypothyroidism, Increased body mass index, Induration penis plastica, Mononeuritis, Neuropathy, Non-alcoholic fatty liver disease, Obesity, Osteopenia, Osteoporosis, Palpitations, Pituitary adenoma ([address] ), Pneumonia, Sebaceous cyst, Testicular hypofunction, Thrombocytopenic disorder ([address] ), Tremor, and Vitamin D deficiency  ,  _______________________________________________________________________  Medical Problems:  does not have any pertinent problems on file ,  _______________________________________________________________________  Past Surgical History:   has a past surgical history that includes Lipoma resection (Right, 2014); Lumbar fusion (2010); Toenail excision (Left, 1993); Laminectomy; Mole removal; Hernia repair (8983); EGD (07/15/2019); EGD (04/03/2018); Back surgery (2010); Cyst Removal (1987); and Cologuard (Historical) (04/01/2019)  ,  _______________________________________________________________________  Family History:  family history includes Aortic stenosis in his father; Atrial fibrillation in his father; Coronary artery disease in his father; Dementia in his father; Heart disease in his father; Other in his father; Stroke in his father ,  _______________________________________________________________________  Social History:   reports that he has never smoked  He has never used smokeless tobacco  He reports previous alcohol use  He reports previous drug use ,  _______________________________________________________________________  Allergies:  is allergic to carvedilol, metoprolol, aspirin, ciprofloxacin, clindamycin, propranolol, and pollen extract     _______________________________________________________________________  Current Outpatient Medications   Medication Sig Dispense Refill    acetaminophen (TYLENOL) 650 mg CR tablet Take 650 mg by mouth every 8 (eight) hours as needed      Biotin 98262 MCG TABS       Calcium Carb-Cholecalciferol (CALCIUM/VITAMIN D PO)       famotidine (PEPCID) 10 mg tablet Take 10 mg by mouth daily at bedtime      gabapentin (NEURONTIN) 300 mg capsule Take 2 capsules (600 mg total) by mouth daily at bedtime 60 capsule 6    Lactobacillus (ACIDOPHILUS PO) Take 1 tablet by mouth 2 (two) times a day      lidocaine (LMX) 4 % cream Apply topically 2 (two) times a day 30 g 1    omeprazole (PriLOSEC) 20 mg delayed release capsule Take 20 mg by mouth daily      other medication, see sig, Take by mouth      Polyethyl Glycol-Propyl Glycol (SYSTANE OP)       primidone (MYSOLINE) 50 mg tablet Take 150 mg by mouth 2 (two) times a day      tadalafil (Cialis) 5 MG tablet       VERAPAMIL HCL PO 1 po daily      Cholecalciferol (Vitamin D3) 50 MCG (2000 UT) TABS Take 2,000 Units by mouth daily      Anita, Zingiber officinalis, (Anita Root) 500 MG CAPS Take 1 capsule by mouth 3 (three) times a day (Patient not taking: Reported on 7/28/2021)       No current facility-administered medications for this visit      _______________________________________________________________________  Review of Systems   Skin:        Skin lesion right ear         Objective:  Vitals:    09/03/21 0853   BP: 122/74   BP Location: Left arm   Patient Position: Sitting   Cuff Size: Adult   Pulse: 63   Resp: 16   Temp: 98 4 °F (36 9 °C)   TempSrc: Temporal   SpO2: 95%   Weight: 123 kg (271 lb 4 oz)   Height: 6' 2" (1 88 m)     Body mass index is 34 83 kg/m²  Physical Exam  Vitals reviewed  Constitutional:       Appearance: Normal appearance  HENT:      Head: Normocephalic and atraumatic  Right Ear: External ear normal       Left Ear: External ear normal       Nose: Nose normal       Mouth/Throat:      Mouth: Mucous membranes are moist    Skin:     Findings: Lesion present  Comments: Right ear skin lesion, possible BCC   Neurological:      Mental Status: He is alert  BMI Counseling: Body mass index is 34 83 kg/m²  The BMI is above normal  Nutrition recommendations include reducing portion sizes, decreasing overall calorie intake, 3-5 servings of fruits/vegetables daily, reducing fast food intake, consuming healthier snacks, decreasing soda and/or juice intake, moderation in carbohydrate intake, increasing intake of lean protein, reducing intake of saturated fat and trans fat and reducing intake of cholesterol

## 2021-09-14 ENCOUNTER — APPOINTMENT (OUTPATIENT)
Dept: URGENT CARE | Age: 66
End: 2021-09-14
Payer: MEDICARE

## 2021-09-14 ENCOUNTER — TELEPHONE (OUTPATIENT)
Dept: FAMILY MEDICINE CLINIC | Facility: CLINIC | Age: 66
End: 2021-09-14

## 2021-09-14 PROCEDURE — 0241U HB NFCT DS VIR RESP RNA 4 TRGT: CPT | Performed by: INTERNAL MEDICINE

## 2021-09-14 NOTE — TELEPHONE ENCOUNTER
Patient was exposed to someone with Covid  He now has stuffy nose, sore throat, stiff neck, and would like to get Covid tested  Please call patient back if order is put in

## 2021-09-27 PROCEDURE — 88305 TISSUE EXAM BY PATHOLOGIST: CPT | Performed by: PATHOLOGY

## 2021-09-28 ENCOUNTER — LAB REQUISITION (OUTPATIENT)
Dept: LAB | Facility: HOSPITAL | Age: 66
End: 2021-09-28
Payer: MEDICARE

## 2021-09-28 DIAGNOSIS — D48.5 NEOPLASM OF UNCERTAIN BEHAVIOR OF SKIN: ICD-10-CM

## 2021-10-11 PROCEDURE — 88305 TISSUE EXAM BY PATHOLOGIST: CPT | Performed by: PATHOLOGY

## 2021-10-12 ENCOUNTER — LAB REQUISITION (OUTPATIENT)
Dept: LAB | Facility: HOSPITAL | Age: 66
End: 2021-10-12
Payer: MEDICARE

## 2021-10-12 DIAGNOSIS — D48.5 NEOPLASM OF UNCERTAIN BEHAVIOR OF SKIN: ICD-10-CM

## 2021-12-17 ENCOUNTER — TELEPHONE (OUTPATIENT)
Dept: FAMILY MEDICINE CLINIC | Facility: CLINIC | Age: 66
End: 2021-12-17

## 2021-12-27 ENCOUNTER — TELEPHONE (OUTPATIENT)
Dept: FAMILY MEDICINE CLINIC | Facility: CLINIC | Age: 66
End: 2021-12-27

## 2022-01-27 ENCOUNTER — OFFICE VISIT (OUTPATIENT)
Dept: FAMILY MEDICINE CLINIC | Facility: CLINIC | Age: 67
End: 2022-01-27
Payer: MEDICARE

## 2022-01-27 VITALS
HEART RATE: 66 BPM | RESPIRATION RATE: 16 BRPM | HEIGHT: 74 IN | TEMPERATURE: 99.1 F | WEIGHT: 272.13 LBS | SYSTOLIC BLOOD PRESSURE: 132 MMHG | BODY MASS INDEX: 34.93 KG/M2 | DIASTOLIC BLOOD PRESSURE: 90 MMHG | OXYGEN SATURATION: 96 %

## 2022-01-27 DIAGNOSIS — N40.1 BENIGN PROSTATIC HYPERPLASIA WITH LOWER URINARY TRACT SYMPTOMS, SYMPTOM DETAILS UNSPECIFIED: ICD-10-CM

## 2022-01-27 DIAGNOSIS — Z98.1 H/O SPINAL FUSION: ICD-10-CM

## 2022-01-27 DIAGNOSIS — I73.9 PAD (PERIPHERAL ARTERY DISEASE) (HCC): ICD-10-CM

## 2022-01-27 DIAGNOSIS — F32.A MILD DEPRESSION: ICD-10-CM

## 2022-01-27 DIAGNOSIS — E23.6 PITUITARY MASS (HCC): ICD-10-CM

## 2022-01-27 DIAGNOSIS — I47.1 ATRIAL TACHYCARDIA (HCC): ICD-10-CM

## 2022-01-27 DIAGNOSIS — M54.9 CHRONIC BACK PAIN, UNSPECIFIED BACK LOCATION, UNSPECIFIED BACK PAIN LATERALITY: ICD-10-CM

## 2022-01-27 DIAGNOSIS — G25.0 ESSENTIAL TREMOR: ICD-10-CM

## 2022-01-27 DIAGNOSIS — G89.29 CHRONIC BACK PAIN, UNSPECIFIED BACK LOCATION, UNSPECIFIED BACK PAIN LATERALITY: ICD-10-CM

## 2022-01-27 DIAGNOSIS — D69.6 THROMBOCYTOPENIA (HCC): ICD-10-CM

## 2022-01-27 DIAGNOSIS — I10 HYPERTENSION, UNSPECIFIED TYPE: Primary | ICD-10-CM

## 2022-01-27 DIAGNOSIS — G62.9 NEUROPATHY: ICD-10-CM

## 2022-01-27 DIAGNOSIS — E66.01 MORBID OBESITY (HCC): ICD-10-CM

## 2022-01-27 DIAGNOSIS — M50.30 DDD (DEGENERATIVE DISC DISEASE), CERVICAL: ICD-10-CM

## 2022-01-27 DIAGNOSIS — K76.0 FATTY LIVER: ICD-10-CM

## 2022-01-27 PROBLEM — M25.562 LEFT KNEE PAIN: Status: ACTIVE | Noted: 2022-01-27

## 2022-01-27 PROCEDURE — 99214 OFFICE O/P EST MOD 30 MIN: CPT | Performed by: INTERNAL MEDICINE

## 2022-01-27 RX ORDER — DULOXETIN HYDROCHLORIDE 30 MG/1
30 CAPSULE, DELAYED RELEASE ORAL DAILY
Qty: 30 CAPSULE | Refills: 5 | Status: SHIPPED | OUTPATIENT
Start: 2022-01-27

## 2022-01-27 NOTE — ASSESSMENT & PLAN NOTE
Likely OA changes suggested 1 lb ankle weight exercises, brace, nsaids prn, topical nsaids-hold off on Xray for noiw

## 2022-01-27 NOTE — PROGRESS NOTES
Assessment/Plan:         Problem List Items Addressed This Visit        Digestive    Fatty liver       Cardiovascular and Mediastinum    Atrial tachycardia (HCC)    PAD (peripheral artery disease) (HCC)       Nervous and Auditory    Essential tremor    Neuropathy     Will try switching from neurontin to duloxetine         Relevant Medications    DULoxetine (Cymbalta) 30 mg delayed release capsule       Musculoskeletal and Integument    DDD (degenerative disc disease), cervical       Genitourinary    BPH (benign prostatic hyperplasia)       Other    Pituitary mass (HCC)    H/O spinal fusion    Chronic back pain    Thrombocytopenia (HCC)    Mild depression (HCC)    Relevant Medications    DULoxetine (Cymbalta) 30 mg delayed release capsule    Morbid obesity ([facility] [address] )      Other Visit Diagnoses     Hypertension, unspecified type    -  Primary    BMI 34 0-34 9,adult                Subjective:      Patient ID: Levar Perera is a 78 y o  male      Charo Curiel here for 6 month follow up of multiple issues-doing pretty well right now-his left knee bothers him at times, so we discussed that-he is now on daily cialis per urology-he does complain of difficulty ejaculating during sexual encounters and says his urologist told him to stop the neurontin which he did-however, after about 2 days the pain in his legs was horrible so he went back on it-he would like to try something different for neuropathy that doesn't have ejaculation side effects-we discussed lyrica and duloxetine and Charo Curiel will try the duloxetine      The following portions of the patient's history were reviewed and updated as appropriate:   Past Medical History:  He has a past medical history of Alkaline phosphatase raised, Arthralgia of foot, Arthritis, Aspirin allergy, Asthma, Chronic back pain, Cobalamin deficiency, Displacement of cervical intervertebral disc without myelopathy, Edema of lower extremity, Gallstone, GERD (gastroesophageal reflux disease), Heart murmur, Hepatitis B, History of diabetes mellitus, History of echocardiogram (02/23/2017), History of EKG (09/15/2017), History of stress test (03/2017), Hypertension, Hypoglycemia, Hypothyroidism, Increased body mass index, Induration penis plastica, Mononeuritis, Neuropathy, Non-alcoholic fatty liver disease, Obesity, Osteopenia, Osteoporosis, Palpitations, Pituitary adenoma ([facility] [address] ), Pneumonia, Sebaceous cyst, Testicular hypofunction, Thrombocytopenic disorder ([facility] [address] ), Tremor, and Vitamin D deficiency  ,  _______________________________________________________________________  Medical Problems:  does not have any pertinent problems on file ,  _______________________________________________________________________  Past Surgical History:   has a past surgical history that includes Lipoma resection (Right, 2014); Lumbar fusion (2010); Toenail excision (Left, 1993); Laminectomy; Mole removal; Hernia repair (5167); EGD (07/15/2019); EGD (04/03/2018); Back surgery (2010); Cyst Removal (1987); and Cologuard (Historical) (04/01/2019)  ,  _______________________________________________________________________  Family History:  family history includes Aortic stenosis in his father; Atrial fibrillation in his father; Coronary artery disease in his father; Dementia in his father; Heart disease in his father; Other in his father; Stroke in his father ,  _______________________________________________________________________  Social History:   reports that he has never smoked  He has never used smokeless tobacco  He reports previous alcohol use  He reports previous drug use ,  _______________________________________________________________________  Allergies:  is allergic to carvedilol, metoprolol, aspirin, ciprofloxacin, clindamycin, propranolol, and pollen extract     _______________________________________________________________________  Current Outpatient Medications   Medication Sig Dispense Refill    acetaminophen (TYLENOL) 650 mg CR tablet Take 650 mg by mouth every 8 (eight) hours as needed      Biotin 21584 MCG TABS       Calcium Carb-Cholecalciferol (CALCIUM/VITAMIN D PO)       Cholecalciferol (Vitamin D3) 50 MCG (2000 UT) TABS Take 2,000 Units by mouth daily      famotidine (PEPCID) 10 mg tablet Take 10 mg by mouth daily at bedtime      lidocaine (LMX) 4 % cream Apply topically 2 (two) times a day 30 g 1    Multiple Vitamins-Minerals (IMMUNE SUPPORT PO) DAILY DROPS      omeprazole (PriLOSEC) 20 mg delayed release capsule Take 20 mg by mouth daily      Polyethyl Glycol-Propyl Glycol (SYSTANE OP)       primidone (MYSOLINE) 50 mg tablet Take 150 mg by mouth 2 (two) times a day      tadalafil (Cialis) 5 MG tablet       VERAPAMIL HCL PO 1 po daily      DULoxetine (Cymbalta) 30 mg delayed release capsule Take 1 capsule (30 mg total) by mouth daily 30 capsule 5    naproxen (Naprosyn) 500 mg tablet Take 1 tablet (500 mg total) by mouth 2 (two) times a day with meals (Patient taking differently: Take 500 mg by mouth 2 (two) times a day as needed for mild pain  ) 60 tablet 5     No current facility-administered medications for this visit      _______________________________________________________________________  Review of Systems   Constitutional: Positive for fatigue  Eyes: Negative  Respiratory: Negative  Cardiovascular: Negative  Gastrointestinal: Negative  Musculoskeletal: Positive for back pain and neck pain  Neurological: Positive for numbness  Hematological: Negative  Psychiatric/Behavioral: Negative  Objective:  Vitals:    01/27/22 1301   BP: 132/90   BP Location: Left arm   Patient Position: Sitting   Cuff Size: Adult   Pulse: 66   Resp: 16   Temp: 99 1 °F (37 3 °C)   TempSrc: Temporal   SpO2: 96%   Weight: 123 kg (272 lb 2 oz)   Height: 6' 2" (1 88 m)     Body mass index is 34 94 kg/m²  Physical Exam  Constitutional:       Appearance: He is obese     HENT:      Head: Normocephalic and atraumatic  Right Ear: External ear normal       Left Ear: External ear normal       Nose: Nose normal       Mouth/Throat:      Pharynx: Oropharynx is clear  Eyes:      Pupils: Pupils are equal, round, and reactive to light  Cardiovascular:      Rate and Rhythm: Normal rate and regular rhythm  Heart sounds: No murmur heard  Pulmonary:      Effort: Pulmonary effort is normal       Breath sounds: Normal breath sounds  Musculoskeletal:         General: Normal range of motion  Skin:     General: Skin is warm  Neurological:      General: No focal deficit present  Mental Status: He is alert and oriented to person, place, and time  Mental status is at baseline

## 2022-02-26 DIAGNOSIS — Z76.0 MEDICATION REFILL: ICD-10-CM

## 2022-02-28 RX ORDER — GABAPENTIN 300 MG/1
CAPSULE ORAL
Qty: 60 CAPSULE | Refills: 0 | Status: SHIPPED | OUTPATIENT
Start: 2022-02-28 | End: 2022-03-02

## 2022-03-01 DIAGNOSIS — Z76.0 MEDICATION REFILL: ICD-10-CM

## 2022-03-01 RX ORDER — GABAPENTIN 300 MG/1
300 CAPSULE ORAL 2 TIMES DAILY
Qty: 60 CAPSULE | Refills: 0 | OUTPATIENT
Start: 2022-03-01

## 2022-03-07 ENCOUNTER — TELEPHONE (OUTPATIENT)
Dept: FAMILY MEDICINE CLINIC | Facility: CLINIC | Age: 67
End: 2022-03-07

## 2022-03-07 ENCOUNTER — OFFICE VISIT (OUTPATIENT)
Dept: URGENT CARE | Age: 67
End: 2022-03-07
Payer: MEDICARE

## 2022-03-07 VITALS
BODY MASS INDEX: 33.62 KG/M2 | DIASTOLIC BLOOD PRESSURE: 94 MMHG | HEIGHT: 74 IN | RESPIRATION RATE: 18 BRPM | SYSTOLIC BLOOD PRESSURE: 167 MMHG | TEMPERATURE: 98.1 F | WEIGHT: 262 LBS | HEART RATE: 79 BPM

## 2022-03-07 DIAGNOSIS — T78.40XA ALLERGIC REACTION, INITIAL ENCOUNTER: Primary | ICD-10-CM

## 2022-03-07 PROCEDURE — G0463 HOSPITAL OUTPT CLINIC VISIT: HCPCS | Performed by: NURSE PRACTITIONER

## 2022-03-07 PROCEDURE — 99213 OFFICE O/P EST LOW 20 MIN: CPT | Performed by: NURSE PRACTITIONER

## 2022-03-07 RX ORDER — DEXAMETHASONE SODIUM PHOSPHATE 10 MG/ML
10 INJECTION, SOLUTION INTRAMUSCULAR; INTRAVENOUS ONCE
Status: COMPLETED | OUTPATIENT
Start: 2022-03-07 | End: 2022-03-07

## 2022-03-07 RX ORDER — PREDNISONE 20 MG/1
20 TABLET ORAL 2 TIMES DAILY WITH MEALS
Qty: 10 TABLET | Refills: 0 | Status: SHIPPED | OUTPATIENT
Start: 2022-03-07 | End: 2022-03-12

## 2022-03-07 RX ADMIN — DEXAMETHASONE SODIUM PHOSPHATE 10 MG: 10 INJECTION, SOLUTION INTRAMUSCULAR; INTRAVENOUS at 08:55

## 2022-03-07 NOTE — PROGRESS NOTES
[facility]        NAME: Levar ePrera is a 78 y o  male  : 1955    MRN: [id number]  DATE: 2022  TIME: 9:46 AM    Assessment and Plan   Allergic reaction, initial encounter [T78 40XA]  1  Allergic reaction, initial encounter  dexamethasone (PF) (DECADRON) injection 10 mg    predniSONE 20 mg tablet         Patient Instructions     Given Decadron IM 10 mg at the clinic  Rx sent to pharmacy  Follow up with PCP; recommend food allergy panel if appropriate   Proceed to  ER if symptoms worsen  Chief Complaint     Chief Complaint   Patient presents with    Allergic Reaction     last night         History of Present Illness       HPI   Reports that he started having swelling on his lips yesterday in the evening after eating at a restaurant  Says he went home and took an NSAID OTC x 2  Says the swelling extended into his throat but then stopped getting worse  Woke up this morning and the swelling of the lips persist  Denies nausea or vomiting  No nausea or vomiting  No lightheadedness  No known Hx of allergy to foods  Several allergies to meds  Review of Systems   Review of Systems   Constitutional: Negative for chills and fever  HENT: Positive for facial swelling (lips)  Negative for congestion, drooling, mouth sores, rhinorrhea, sore throat, trouble swallowing and voice change  Eyes: Negative for visual disturbance (no change from baseline)  Respiratory: Negative for cough, chest tightness, shortness of breath and wheezing  Cardiovascular: Negative for chest pain and palpitations  Gastrointestinal: Negative for diarrhea, nausea and vomiting  Neurological: Negative for light-headedness and headaches           Current Medications       Current Outpatient Medications:     acetaminophen (TYLENOL) 650 mg CR tablet, Take 650 mg by mouth every 8 (eight) hours as needed, Disp: , Rfl:     Biotin 90958 MCG TABS, , Disp: , Rfl:     Calcium Carb-Cholecalciferol (CALCIUM/VITAMIN D PO), , Disp: , Rfl:     Cholecalciferol (Vitamin D3) 50 MCG (2000 UT) TABS, Take 2,000 Units by mouth daily, Disp: , Rfl:     DULoxetine (Cymbalta) 30 mg delayed release capsule, Take 1 capsule (30 mg total) by mouth daily, Disp: 30 capsule, Rfl: 5    famotidine (PEPCID) 10 mg tablet, Take 10 mg by mouth daily at bedtime, Disp: , Rfl:     gabapentin (NEURONTIN) 300 mg capsule, TAKE ONE CAPSULE BY MOUTH TWICE DAILY, Disp: 60 capsule, Rfl: 3    lidocaine (LMX) 4 % cream, Apply topically 2 (two) times a day, Disp: 30 g, Rfl: 1    Multiple Vitamins-Minerals (IMMUNE SUPPORT PO), DAILY DROPS, Disp: , Rfl:     naproxen (Naprosyn) 500 mg tablet, Take 1 tablet (500 mg total) by mouth 2 (two) times a day with meals (Patient taking differently: Take 500 mg by mouth 2 (two) times a day as needed for mild pain  ), Disp: 60 tablet, Rfl: 5    omeprazole (PriLOSEC) 20 mg delayed release capsule, Take 20 mg by mouth daily, Disp: , Rfl:     Polyethyl Glycol-Propyl Glycol (SYSTANE OP), , Disp: , Rfl:     predniSONE 20 mg tablet, Take 1 tablet (20 mg total) by mouth 2 (two) times a day with meals for 5 days, Disp: 10 tablet, Rfl: 0    primidone (MYSOLINE) 50 mg tablet, Take 150 mg by mouth 2 (two) times a day, Disp: , Rfl:     tadalafil (Cialis) 5 MG tablet, , Disp: , Rfl:     VERAPAMIL HCL PO, 1 po daily, Disp: , Rfl:   No current facility-administered medications for this visit      Current Allergies     Allergies as of 03/07/2022 - Reviewed 03/07/2022   Allergen Reaction Noted    Carvedilol Shortness Of Breath 03/19/2021    Metoprolol Shortness Of Breath and Fatigue 12/30/2020    Aspirin GI Intolerance 08/27/2010    Ciprofloxacin Other (See Comments) 08/27/2010    Clindamycin Other (See Comments) 03/22/2021    Propranolol Other (See Comments) 12/12/2017    Pollen extract Other (See Comments) 09/22/2020            The following portions of the patient's history were reviewed and updated as appropriate: allergies, current medications, past family history, past medical history, past social history, past surgical history and problem list      Past Medical History:   Diagnosis Date    Alkaline phosphatase raised     Arthralgia of foot     arthralgia of the ankle and/or foot    Arthritis     Aspirin allergy     Asthma     Chronic back pain     Cobalamin deficiency     Displacement of cervical intervertebral disc without myelopathy     Edema of lower extremity     Gallstone     GERD (gastroesophageal reflux disease)     Heart murmur     Hepatitis B     History of diabetes mellitus     History of echocardiogram 02/23/2017    Nondilated LV with well-preserved systolic function and estimated EF of 50%  Mild septal dyskinesis was noted with otherwise normal wall motion  Moderate to severe LVH was noted with evidence of grade I diastolic dysfunction  Aortic valve sclerosis and mitral annular calcifcation were present with no evidence of significant valvular stenosis  Color Doppler examination as remarkable for trace MR, 1    History of EKG 09/15/2017    Sinus rhythm with 1st degree AV block, Left bundle branch block, Abnormal ECG  EKG 5/25/17-Sinus rhythm with 1st degree AV block, Sinus rhythm with occasional premature ventricular complexes and fusion complexes  Cannot rule out Anteroseptal infarct, age undertermind  T wave abnormality, consider lateral ischemia abnormal ECG  EKG 3/24/17-Sinus rhythm with 1st degree AV block with occasional giorgio    History of stress test 03/2017     Mildly abnormal study with evidence of a fixed inferoseptal defect  This is most likely related to attenuation artifact, however, the possibility of a nontransmural myocardial infarction involving these segments cannot be completely excluded  Quantitative analysis indicates equivocal ischemia in the mid segment of the septum  This is most likely related to artifacts   Gated wall motion analysis wa    Hypertension     Hypoglycemia     Hypothyroidism     Increased body mass index     Induration penis plastica     Mononeuritis     Neuropathy     Non-alcoholic fatty liver disease     Obesity     Osteopenia     Osteoporosis     Palpitations     Pituitary adenoma (HCC)     Pneumonia     Sebaceous cyst     infection of sebaceous cyst    Testicular hypofunction     Thrombocytopenic disorder (HCC)     Tremor     Vitamin D deficiency        Past Surgical History:   Procedure Laterality Date    BACK SURGERY  2010 2010- Fusion L4-L5-S1    COLOGUARD (HISTORICAL)  04/01/2019    Negative    CYST REMOVAL  1987 1987- Sebaceous cyst removed from back    EGD  07/15/2019    EGD  04/03/2018    HERNIA REPAIR  1957    LAMINECTOMY      Laminectomy with internal fixation L4-S1    LIPOMA RESECTION Right 2014    abdomen    LUMBAR FUSION  2010    L4-L5-S1    MOLE REMOVAL      back and face    TOENAIL EXCISION Left 1993       Family History   Problem Relation Age of Onset    Atrial fibrillation Father     Dementia Father     Heart disease Father         pacemaker    Stroke Father     Coronary artery disease Father     Aortic stenosis Father     Other Father         muscle atrophy         Medications have been verified  Objective   /94   Pulse 79   Temp 98 1 °F (36 7 °C)   Resp 18   Ht 6' 2" (1 88 m)   Wt 119 kg (262 lb)   BMI 33 64 kg/m²   No LMP for male patient  Physical Exam     Physical Exam  Constitutional:       Appearance: He is not ill-appearing or diaphoretic  HENT:      Head:      Comments: There is moderate swelling of the left side of the lower lip  Facial muscles are normal  PERRLA  EOM is normal     Right Ear: Tympanic membrane and ear canal normal       Left Ear: Tympanic membrane and ear canal normal       Nose: Rhinorrhea present  Mouth/Throat:      Mouth: Mucous membranes are moist       Pharynx: No posterior oropharyngeal erythema     Cardiovascular:      Rate and Rhythm: Normal rate and regular rhythm  Heart sounds: Normal heart sounds  Pulmonary:      Effort: Pulmonary effort is normal       Breath sounds: Normal breath sounds  No wheezing  Musculoskeletal:      Cervical back: No rigidity  Lymphadenopathy:      Cervical: No cervical adenopathy

## 2022-03-07 NOTE — TELEPHONE ENCOUNTER
Patient was seen at [facility] this morning having had an allergic reaction to something   He would like to get tested for allergies should he see you or an allergist

## 2022-03-07 NOTE — PATIENT INSTRUCTIONS
General Allergic Reaction   WHAT YOU NEED TO KNOW:   An allergic reaction is your body's response to an allergen  Allergens include medicines, food, insect stings, animal dander, mold, latex, chemicals, and dust mites  Pollen from trees, grass, and weeds can also cause an allergic reaction  An allergic reaction can range from mild to severe  DISCHARGE INSTRUCTIONS:   Call 911 for signs or symptoms of anaphylaxis,  such as trouble breathing, swelling in your mouth or throat, or wheezing  You may also have itching, a rash, hives, or feel like you are going to faint  Return to the emergency department if:   · You have a skin rash, hives, swelling, or itching that is starting to get worse  · Your throat tightens, or your lips or tongue swell  · You have trouble swallowing or speaking  · You have worsening nausea, diarrhea, or abdominal cramps, or you are vomiting  · You have chest pain or tightness  Contact your healthcare provider if:   · You have questions or concerns about your condition or care  Medicines: You may need any of the following:  · Medicines  may be given to relieve certain allergy symptoms such as itching, sneezing, and swelling  You may take them as a pill or use drops in your nose or eyes  Topical treatments may be given to put directly on your skin to help decrease itching or swelling  · Epinephrine  may be prescribed if you are at risk for anaphylaxis  This is a severe allergic reaction that can be life-threatening  Your healthcare provider will tell you if you need to keep epinephrine with you  You will be taught when and how to use it  · Take your medicine as directed  Contact your healthcare provider if you think your medicine is not helping or if you have side effects  Tell him of her if you are allergic to any medicine  Keep a list of the medicines, vitamins, and herbs you take  Include the amounts, and when and why you take them   Bring the list or the pill bottles to follow-up visits  Carry your medicine list with you in case of an emergency  Follow up with your doctor as directed:  Write down your questions so you remember to ask them during your visits  Manage your symptoms:   · Avoid allergens  You may need to have allergy testing with your healthcare provider or a specialist to find your allergens  · Use cold compresses on your skin or eyes  This will help soothe skin or eyes affected by the allergic reaction  You can make a cold compress by soaking a washcloth in cool water  Wring out the extra water before you apply the washcloth  · Rinse your nasal passages with a saline solution  Daily rinsing may help clear allergens out of your nose  Use distilled water if possible  You can also boil tap water and then let it cool before you use it  Do not use tap water without boiling it first     · Do not smoke  Nicotine and other chemicals in cigarettes and cigars can make an allergic reaction worse, and can also cause lung damage  Ask your healthcare provider for information if you currently smoke and need help to quit  E-cigarettes or smokeless tobacco still contain nicotine  Talk to your healthcare provider before you use these products  © Copyright [employer] [facility] 2022 Information is for End User's use only and may not be sold, redistributed or otherwise used for commercial purposes  All illustrations and images included in CareNotes® are the copyrighted property of [employer]  or [facility] Adelfo Castorena  The above information is an  only  It is not intended as medical advice for individual conditions or treatments  Talk to your doctor, nurse or pharmacist before following any medical regimen to see if it is safe and effective for you

## 2022-05-25 ENCOUNTER — TELEPHONE (OUTPATIENT)
Dept: FAMILY MEDICINE CLINIC | Facility: CLINIC | Age: 67
End: 2022-05-25

## 2022-06-14 ENCOUNTER — RA CDI HCC (OUTPATIENT)
Dept: OTHER | Facility: HOSPITAL | Age: 67
End: 2022-06-14

## 2022-06-14 NOTE — PROGRESS NOTES
[facility] [address]  coding opportunities       Chart reviewed, no opportunity found: CHART REVIEWED, NO OPPORTUNITY FOUND        Patients Insurance     Medicare Insurance: Medicare

## 2022-06-21 ENCOUNTER — OFFICE VISIT (OUTPATIENT)
Dept: FAMILY MEDICINE CLINIC | Facility: CLINIC | Age: 67
End: 2022-06-21
Payer: MEDICARE

## 2022-06-21 VITALS
WEIGHT: 247 LBS | SYSTOLIC BLOOD PRESSURE: 138 MMHG | BODY MASS INDEX: 31.71 KG/M2 | DIASTOLIC BLOOD PRESSURE: 86 MMHG | TEMPERATURE: 97.9 F | HEART RATE: 86 BPM

## 2022-06-21 DIAGNOSIS — Z01.818 PRE-OP EXAMINATION: Primary | ICD-10-CM

## 2022-06-21 PROCEDURE — 99214 OFFICE O/P EST MOD 30 MIN: CPT | Performed by: INTERNAL MEDICINE

## 2022-06-21 NOTE — PROGRESS NOTES
Assessment/Plan:    No problem-specific Assessment & Plan notes found for this encounter  Diagnoses and all orders for this visit:    Pre-op examination  Patient here for preoperative clearance for cataract surgery  Patient's past medical history reviewed at length, for patient does not have any past medical history concerning for ischemic cardiac disease or valvular heart disease  Patient today reports that he has been able to walk around several flights of stairs and walk around several city blocks without any discomfort or shortness of breath  No history of ACS, TIA, stroke, insulin use, recent creatinine from March 2022-0 8  RCRI score 0  3 9% risk of MACE  Patient is low risk for a low risk surgery like cataract  No blood thinners use noted  Patient currently not on tamsulosin, but advised that if his urologist does eventually put him on tamsulosin before cataract surgery, he should let the eye surgeon know about this  Expresses agreement  Elevated PSA  PSA elevated from 4 3 (march 2022) to 5 22 (June 2022); ordered and followed by Dr Dsaia Cox  Advised patient that he should follow up with Dr Dasia Cox for the same  GERD  Currently under good control with omeprazole and Pepcid  Hypertension  Blood pressure 138/86, slightly on the higher side in the office  Will advise ambulatory monitor at home  Currently on verapamil  History of cholecystectomy  Done sometime earlier this year  Reports that ever since then he has been having occasional liquidy stools however is not distressed by it  Reports that it depends on what he eats  No abdominal pain, bleeding stool reported  Subjective:      Patient ID: Matt Becerra is a 75 y o  male  Patient is a 69-year-old male who presents today with no significant primary complaints  Reports that he is scheduled to undergo right-sided cataract surgery on 29th June, here for preoperative clearance    At this time patient denies any chest pain, shortness of breath, at rest or at exertion  Reports that he is comfortably able to walk up and down a few flights of stairs without any limitations  Also reports that he on daily basis walks for about 30-40 minutes  Reports that he can walk around a few CT blocks easily without any difficulties  Reports overall good functionality  Denies any personal previous history of CAD, mi, ACS, stroke, TIA, diabetes, ESKD  The following portions of the patient's history were reviewed and updated as appropriate:     Review of Systems   Constitutional: Negative for appetite change, fatigue and fever  HENT: Negative for congestion and trouble swallowing  Eyes: Positive for visual disturbance  Respiratory: Negative for cough, chest tightness, shortness of breath and wheezing  Cardiovascular: Negative for chest pain, palpitations and leg swelling  Gastrointestinal: Negative for abdominal pain, constipation, diarrhea and vomiting  Endocrine: Negative for cold intolerance and heat intolerance  Genitourinary: Positive for difficulty urinating  Musculoskeletal: Negative for arthralgias and gait problem  Skin: Negative for color change  Neurological: Negative for dizziness and headaches  Psychiatric/Behavioral: Negative for agitation and confusion  Objective:      /86 (BP Location: Right arm, Patient Position: Sitting, Cuff Size: Adult)   Pulse 86   Temp 97 9 °F (36 6 °C)   Wt 112 kg (247 lb)   BMI 31 71 kg/m²          Physical Exam  Vitals and nursing note reviewed  Constitutional:       General: He is not in acute distress  Appearance: He is not toxic-appearing  HENT:      Head: Normocephalic  Nose: Nose normal       Mouth/Throat:      Mouth: Mucous membranes are moist    Eyes:      General: No scleral icterus  Extraocular Movements: Extraocular movements intact  Conjunctiva/sclera: Conjunctivae normal    Cardiovascular:      Rate and Rhythm: Normal rate  Rhythm irregular  Pulses: Normal pulses  Pulmonary:      Effort: Pulmonary effort is normal  No respiratory distress  Abdominal:      Palpations: Abdomen is soft  Tenderness: There is no abdominal tenderness  There is no guarding or rebound  Musculoskeletal:      Right lower leg: No edema  Left lower leg: No edema  Comments: varciose veins seen on exam   Skin:     General: Skin is warm  Capillary Refill: Capillary refill takes less than 2 seconds  Coloration: Skin is not jaundiced  Neurological:      General: No focal deficit present  Mental Status: He is alert and oriented to person, place, and time  Mental status is at baseline     Psychiatric:         Mood and Affect: Mood normal          Behavior: Behavior normal

## 2022-07-06 DIAGNOSIS — J32.9 SINUSITIS, UNSPECIFIED CHRONICITY, UNSPECIFIED LOCATION: Primary | ICD-10-CM

## 2022-07-06 RX ORDER — AMOXICILLIN AND CLAVULANATE POTASSIUM 875; 125 MG/1; MG/1
1 TABLET, FILM COATED ORAL EVERY 12 HOURS SCHEDULED
Qty: 20 TABLET | Refills: 0 | Status: SHIPPED | OUTPATIENT
Start: 2022-07-06 | End: 2022-07-16

## 2022-07-18 PROCEDURE — 88305 TISSUE EXAM BY PATHOLOGIST: CPT | Performed by: PATHOLOGY

## 2022-07-20 ENCOUNTER — LAB REQUISITION (OUTPATIENT)
Dept: LAB | Facility: HOSPITAL | Age: 67
End: 2022-07-20
Payer: MEDICARE

## 2022-07-20 DIAGNOSIS — D48.5 NEOPLASM OF UNCERTAIN BEHAVIOR OF SKIN: ICD-10-CM

## 2022-09-08 ENCOUNTER — OFFICE VISIT (OUTPATIENT)
Dept: FAMILY MEDICINE CLINIC | Facility: CLINIC | Age: 67
End: 2022-09-08
Payer: MEDICARE

## 2022-09-08 VITALS — WEIGHT: 240 LBS | BODY MASS INDEX: 30.81 KG/M2

## 2022-09-08 DIAGNOSIS — Z12.11 COLON CANCER SCREENING: ICD-10-CM

## 2022-09-08 DIAGNOSIS — Z00.00 MEDICARE ANNUAL WELLNESS VISIT, SUBSEQUENT: Primary | ICD-10-CM

## 2022-09-08 DIAGNOSIS — Z12.5 PROSTATE CANCER SCREENING: ICD-10-CM

## 2022-09-08 DIAGNOSIS — Z71.89 ADVANCED CARE PLANNING/COUNSELING DISCUSSION: ICD-10-CM

## 2022-09-08 DIAGNOSIS — U07.1 COVID: ICD-10-CM

## 2022-09-08 PROCEDURE — G0439 PPPS, SUBSEQ VISIT: HCPCS | Performed by: INTERNAL MEDICINE

## 2022-09-08 PROCEDURE — G0438 PPPS, INITIAL VISIT: HCPCS | Performed by: INTERNAL MEDICINE

## 2022-09-08 RX ORDER — DEXAMETHASONE 6 MG/1
6 TABLET ORAL DAILY
COMMUNITY
Start: 2022-09-08 | End: 2022-09-08

## 2022-09-08 NOTE — PROGRESS NOTES
Virtual AWV Consent    Verification of patient location:    Patient is located in the following state in which I hold an active license [place]    Reason for visit is [address], follow up covid hospitalization, ACP    Encounter provider Sher Salazar MD    Provider located at [address] [facility]  [address] [place] [address] [address] [place] [id number] [phone]      Recent Visits  No visits were found meeting these conditions  Showing recent visits within past 7 days and meeting all other requirements  Today's Visits  Date Type Provider Dept   09/08/22 Office Visit Su Choe MD [address] today's visits and meeting all other requirements  Future Appointments  No visits were found meeting these conditions  Showing future appointments within next 150 days and meeting all other requirements       After connecting through [email or web address], the patient was identified by name and date of birth  Pat Lane was informed that this is a telemedicine visit and that the visit is being conducted through [facility] and patient was informed this is a secure, HIPAA-complaint platform  He agrees to proceed  My office door was closed  No one else was in the room  He acknowledged consent and understanding of privacy and security of the video platform  Pat Lane verbally agrees to participate in [employer]  Pt is aware that [employer] could be limited without vital signs or the ability to perform a full hands-on physical Joceline Rounds understands he or the provider may request at any time to terminate the video visit and request the patient to seek care or treatment in person  Patient is aware this is a billable service     Assessment and Plan:     Problem List Items Addressed This Visit    None     Visit Diagnoses     Medicare annual wellness visit, subsequent    -  Primary    COVID        Colon cancer screening        He did just recently have scope done with Dr Dotty Velázquez    Prostate cancer screening        Had biopsy done recently for elevated PSA  results are pending    Advanced care planning/counseling discussion               Preventive health issues were discussed with patient, and age appropriate screening tests were ordered as noted in patient's After Visit Summary  Personalized health advice and appropriate referrals for health education or preventive services given if needed, as noted in patient's After Visit Summary  History of Present Illness:     Patient presents for a Medicare Wellness Visit    Francine Rivera did virtual MAW, ACP, to discuss his recent hospitalization for covid  Francine Rivera says he was admitted for 4 days at [facility] secondary to covid, hypoxia  Improved with dexamethasone and remdesivir  He received several doses   He is feeling better now, just very tired  Patient Care Team:  Zainab Valero MD as PCP - General (Internal Medicine)     Review of Systems:     Review of Systems   Constitutional: Positive for fatigue  Respiratory: Positive for cough           Problem List:     Patient Active Problem List   Diagnosis    Left bundle branch block    Pituitary mass ([address] )    H/O spinal fusion    Atrial tachycardia (HCC)    Left ventricular hypertrophy    Chronic back pain    Neck pain    Intestinal metaplasia of gastric cardia    History of colonic polyps    Asthma    Fatty liver    LVH (left ventricular hypertrophy)    S/P laminectomy    Splenomegaly    Thrombocytopenia (HCC)    Right shoulder pain    Nocturia    Essential tremor    Erectile dysfunction    DDD (degenerative disc disease), cervical    Impingement syndrome of right shoulder    Cervical radiculopathy    BPH (benign prostatic hyperplasia)    PAD (peripheral artery disease) (HCC)    Dysuria    Herpes zoster without complication    Mild depression    Morbid obesity (HCC)    Neuropathy    Left knee pain      Past Medical and Surgical History:     Past Medical History:   Diagnosis Date    Alkaline phosphatase raised     Arthralgia of foot     arthralgia of the ankle and/or foot    Arthritis     Aspirin allergy     Asthma     Chronic back pain     Cobalamin deficiency     Displacement of cervical intervertebral disc without myelopathy     Edema of lower extremity     Gallstone     GERD (gastroesophageal reflux disease)     Heart murmur     Hepatitis B     History of diabetes mellitus     History of echocardiogram 02/23/2017    Nondilated LV with well-preserved systolic function and estimated EF of 50%  Mild septal dyskinesis was noted with otherwise normal wall motion  Moderate to severe LVH was noted with evidence of grade I diastolic dysfunction  Aortic valve sclerosis and mitral annular calcifcation were present with no evidence of significant valvular stenosis  Color Doppler examination as remarkable for trace MR, 1    History of EKG 09/15/2017    Sinus rhythm with 1st degree AV block, Left bundle branch block, Abnormal ECG  EKG 5/25/17-Sinus rhythm with 1st degree AV block, Sinus rhythm with occasional premature ventricular complexes and fusion complexes  Cannot rule out Anteroseptal infarct, age undertermind  T wave abnormality, consider lateral ischemia abnormal ECG  EKG 3/24/17-Sinus rhythm with 1st degree AV block with occasional giorgio    History of stress test 03/2017     Mildly abnormal study with evidence of a fixed inferoseptal defect  This is most likely related to attenuation artifact, however, the possibility of a nontransmural myocardial infarction involving these segments cannot be completely excluded  Quantitative analysis indicates equivocal ischemia in the mid segment of the septum  This is most likely related to artifacts   Gated wall motion analysis wa    Hypertension     Hypoglycemia     Hypothyroidism     Increased body mass index     Induration penis plastica     Mononeuritis     Neuropathy     Non-alcoholic fatty liver disease     Obesity     Osteopenia     Osteoporosis     Palpitations     Pituitary adenoma ([address] )     Pneumonia     Sebaceous cyst     infection of sebaceous cyst    Testicular hypofunction     Thrombocytopenic disorder (HCC)     Tremor     Vitamin D deficiency      Past Surgical History:   Procedure Laterality Date    BACK SURGERY  2010- Fusion L4-L5-S1    COLOGUARD (HISTORICAL)  2019    Negative    CYST REMOVAL  1987- Sebaceous cyst removed from back    EGD  07/15/2019    EGD  2018    HERNIA REPAIR      LAMINECTOMY      Laminectomy with internal fixation L4-S1    LIPOMA RESECTION Right 2014    abdomen    LUMBAR FUSION      L4-L5-S1    MOLE REMOVAL      back and face    TOENAIL EXCISION Left       Family History:     Family History   Problem Relation Age of Onset    Atrial fibrillation Father     Dementia Father     Heart disease Father         pacemaker    Stroke Father     Coronary artery disease Father     Aortic stenosis Father     Other Father         muscle atrophy      Social History:     Social History     Socioeconomic History    Marital status:      Spouse name: None    Number of children: None    Years of education: None    Highest education level: None   Occupational History    Occupation: Retired - Transportation   Tobacco Use    Smoking status: Never Smoker    Smokeless tobacco: Never Used   Vaping Use    Vaping Use: Never used   Substance and Sexual Activity    Alcohol use: Not Currently    Drug use: Not Currently     Comment: No use    Sexual activity: None   Other Topics Concern    None   Social History Narrative    · Most recent tobacco use screenin2019      · Do you currently or have you served in the [employer]:   No      · Were you activated, into active duty, as a member of the [employer] or as a Reservist:   No      · Marital status:   Single  ()     · Exercise level: None      · Diet:   Regular      · General stress level:   Medium      · Alcohol intake:   Occasional  1-2 a month     · Caffeine intake: Moderate  1-2 cups a day     · Guns present in home:   No      · Seat belts used routinely:   Yes      · Sunscreen used routinely:   Yes      · Advance directive: Yes      · Live alone or with others:   with others  Roommate      · Smoke alarm in home: Yes      · Are there stairs in your home: Yes      · Pets:   Yes  - Cats          Social Determinants of Health     Financial Resource Strain: Low Risk     Difficulty of Paying Living Expenses: Not very hard   Food Insecurity: Not on file   Transportation Needs: No Transportation Needs    Lack of Transportation (Medical): No    Lack of Transportation (Non-Medical): No   Physical Activity: Not on file   Stress: Not on file   Social Connections: Not on file   Intimate Partner Violence: Not on file   Housing Stability: Not on file      Medications and Allergies:     Current Outpatient Medications   Medication Sig Dispense Refill    acetaminophen (TYLENOL) 650 mg CR tablet Take 650 mg by mouth every 8 (eight) hours as needed      Biotin 90054 MCG TABS       Calcium Carb-Cholecalciferol (CALCIUM/VITAMIN D PO)       Cholecalciferol (Vitamin D3) 50 MCG (2000 UT) TABS Take 2,000 Units by mouth daily      lidocaine (LMX) 4 % cream Apply topically 2 (two) times a day 30 g 1    omeprazole (PriLOSEC) 20 mg delayed release capsule Take 20 mg by mouth daily      Polyethyl Glycol-Propyl Glycol (SYSTANE OP)       primidone (MYSOLINE) 50 mg tablet Take 150 mg by mouth 2 (two) times a day      tadalafil (CIALIS) 5 MG tablet Take 5 mg by mouth daily as needed      VERAPAMIL HCL PO 1 po daily       No current facility-administered medications for this visit       Allergies   Allergen Reactions    Carvedilol Shortness Of Breath    Metoprolol Shortness Of Breath and Fatigue    Aspirin GI Intolerance    Ciprofloxacin Other (See Comments)     irregular heart rate    Clindamycin Other (See Comments)     diarrhea    Duloxetine Hcl GI Intolerance and Other (See Comments)    Propranolol Other (See Comments)     Dizzy, lathargic    Pollen Extract Other (See Comments)     Itchy eyes, runny nose, scratchy throat      Immunizations:     Immunization History   Administered Date(s) Administered    COVID-19 PFIZER VACCINE 0 3 ML IM 03/22/2021, 04/14/2021, 10/15/2021, 04/23/2022    INFLUENZA 09/01/2016, 09/29/2016, 10/02/2017, 09/19/2018, 09/07/2020, 10/01/2021    Influenza, injectable, quadrivalent, preservative free 0 5 mL 10/03/2018    Pneumococcal Conjugate 13-Valent 03/22/2019    Pneumococcal Polysaccharide PPV23 10/20/2014    Tdap 05/21/2017    Zoster Vaccine Recombinant 08/09/2021, 12/06/2021    influenza, trivalent, adjuvanted 09/07/2019      Health Maintenance:         Topic Date Due    Colorectal Cancer Screening  04/01/2022    Hepatitis C Screening  Completed         Topic Date Due    Pneumococcal Vaccine: 65+ Years (3 - PPSV23 or PCV20) 03/22/2020    Influenza Vaccine (1) 09/01/2022      Medicare Screening Tests and Risk Assessments:     Matt Gaytan is here for his Subsequent Wellness visit  Last Medicare Wellness visit information reviewed, patient interviewed and updates made to the record today  Health Risk Assessment:   Patient rates overall health as fair  Patient feels that their physical health rating is slightly worse  Patient is satisfied with their life  Eyesight was rated as same  Hearing was rated as same  Patient feels that their emotional and mental health rating is same  Patients states they are sometimes angry  Patient states they are sometimes unusually tired/fatigued  Pain experienced in the last 7 days has been some  Patient's pain rating has been 2/10  Patient states that he has experienced weight loss or gain in last 6 months   Eyesight is better - had cataract sx since last year     Depression Screening: PHQ-9 Score: 0      Fall Risk Screening: In the past year, patient has experienced: no history of falling in past year      Home Safety:  Patient does not have trouble with stairs inside or outside of their home  Patient has working smoke alarms and has working carbon monoxide detector  Home safety hazards include: none  Nutrition:   Current diet is Regular, Low Carb and Limited junk food  Medications:   Patient is currently taking over-the-counter supplements  OTC medications include: see medication list  Patient is able to manage medications  Activities of Daily Living (ADLs)/Instrumental Activities of Daily Living (IADLs):   Walk and transfer into and out of bed and chair?: Yes  Dress and groom yourself?: Yes    Bathe or shower yourself?: Yes    Feed yourself? Yes  Do your laundry/housekeeping?: Yes  Manage your money, pay your bills and track your expenses?: Yes  Make your own meals?: Yes    Do your own shopping?: Yes    Previous Hospitalizations:   Any hospitalizations or ED visits within the last 12 months?: Yes    How many hospitalizations have you had in the last year?: 1-2    Advance Care Planning:   Living will: Yes    Durable POA for healthcare:  Yes    Advanced directive: Yes    Advanced directive counseling given: Yes    Five wishes given: No    End of Life Decisions reviewed with patient: Yes      Cognitive Screening:   Provider or family/friend/caregiver concerned regarding cognition?: No    PREVENTIVE SCREENINGS      Cardiovascular Screening:    General: Risks and Benefits Discussed    Due for: Lipid Panel      Diabetes Screening:     General: Risks and Benefits Discussed    Due for: Blood Glucose      Colorectal Cancer Screening:     General: Screening Current    Due for: Colonoscopy - Low Risk      Prostate Cancer Screening:    General: Risks and Benefits Discussed and Screening Current      Osteoporosis Screening:    General: Screening Not Indicated      Abdominal Aortic Aneurysm (AAA) Screening:    Risk factors include: age between 73-69 yo        General: Screening Not Indicated      Lung Cancer Screening:     General: Screening Not Indicated      Hepatitis C Screening:    General: Screening Current    Screening, Brief Intervention, and Referral to Treatment (SBIRT)    Screening  Typical number of drinks in a day: 0  Typical number of drinks in a week: 0  Interpretation: Low risk drinking behavior  Single Item Drug Screening:  How often have you used an illegal drug (including marijuana) or a prescription medication for non-medical reasons in the past year? never    Single Item Drug Screen Score: 0  Interpretation: Negative screen for possible drug use disorder    No exam data present     Physical Exam:     Wt 109 kg (240 lb)   BMI 30 81 kg/m²     Physical Exam     Sher Salazar MD     BMI Counseling: Body mass index is 30 81 kg/m²  The BMI is above normal  Nutrition recommendations include reducing portion sizes, decreasing overall calorie intake, 3-5 servings of fruits/vegetables daily, reducing fast food intake, consuming healthier snacks, decreasing soda and/or juice intake, moderation in carbohydrate intake, increasing intake of lean protein, reducing intake of saturated fat and trans fat and reducing intake of cholesterol  Exercise recommendations include exercising 3-5 times per week, joining a gym and strength training exercises

## 2022-09-08 NOTE — PATIENT INSTRUCTIONS
Medicare Preventive Visit Patient Instructions  Thank you for completing your Welcome to Medicare Visit or Medicare Annual Wellness Visit today  Your next wellness visit will be due in one year (9/9/2023)  The screening/preventive services that you may require over the next 5-10 years are detailed below  Some tests may not apply to you based off risk factors and/or age  Screening tests ordered at today's visit but not completed yet may show as past due  Also, please note that scanned in results may not display below  Preventive Screenings:  Service Recommendations Previous Testing/Comments   Colorectal Cancer Screening  · Colonoscopy    · Fecal Occult Blood Test (FOBT)/Fecal Immunochemical Test (FIT)  · Fecal DNA/Cologuard Test  · Flexible Sigmoidoscopy Age: 39-70 years old   Colonoscopy: every 10 years (May be performed more frequently if at higher risk)  OR  FOBT/FIT: every 1 year  OR  Cologuard: every 3 years  OR  Sigmoidoscopy: every 5 years  Screening may be recommended earlier than age 39 if at higher risk for colorectal cancer  Also, an individualized decision between you and your healthcare provider will decide whether screening between the ages of 74-80 would be appropriate   Colonoscopy: 06/24/2022  FOBT/FIT: Not on file  Cologuard: Not on file  Sigmoidoscopy: Not on file          Prostate Cancer Screening Individualized decision between patient and health care provider in men between ages of 53-78   Medicare will cover every 12 months beginning on the day after your 50th birthday PSA: No results in last 5 years           Hepatitis C Screening Once for adults born between 1945 and 1965  More frequently in patients at high risk for Hepatitis C Hep C Antibody: 03/16/2018        Diabetes Screening 1-2 times per year if you're at risk for diabetes or have pre-diabetes Fasting glucose: No results in last 5 years (No results in last 5 years)  A1C: No results in last 5 years (No results in last 5 years) Cholesterol Screening Once every 5 years if you don't have a lipid disorder  May order more often based on risk factors  Lipid panel: 03/25/2019         Other Preventive Screenings Covered by Medicare:  1  Abdominal Aortic Aneurysm (AAA) Screening: covered once if your at risk  You're considered to be at risk if you have a family history of AAA or a male between the age of 73-68 who smoking at least 100 cigarettes in your lifetime  2  Lung Cancer Screening: covers low dose CT scan once per year if you meet all of the following conditions: (1) Age 50-69; (2) No signs or symptoms of lung cancer; (3) Current smoker or have quit smoking within the last 15 years; (4) You have a tobacco smoking history of at least 20 pack years (packs per day x number of years you smoked); (5) You get a written order from a healthcare provider  3  Glaucoma Screening: covered annually if you're considered high risk: (1) You have diabetes OR (2) Family history of glaucoma OR (3)  aged 48 and older OR (3)  American aged 72 and older  3  Osteoporosis Screening: covered every 2 years if you meet one of the following conditions: (1) Have a vertebral abnormality; (2) On glucocorticoid therapy for more than 3 months; (3) Have primary hyperparathyroidism; (4) On osteoporosis medications and need to assess response to drug therapy  5  HIV Screening: covered annually if you're between the age of 12-76  Also covered annually if you are younger than 13 and older than 72 with risk factors for HIV infection  For pregnant patients, it is covered up to 3 times per pregnancy      Immunizations:  Immunization Recommendations   Influenza Vaccine Annual influenza vaccination during flu season is recommended for all persons aged >= 6 months who do not have contraindications   Pneumococcal Vaccine   * Pneumococcal conjugate vaccine = PCV13 (Prevnar 13), PCV15 (Vaxneuvance), PCV20 (Prevnar 20)  * Pneumococcal polysaccharide vaccine = PPSV23 (Pneumovax) Adults 2364 years old: 1-3 doses may be recommended based on certain risk factors  Adults 72 years old: 1-2 doses may be recommended based off what pneumonia vaccine you previously received   Hepatitis B Vaccine 3 dose series if at intermediate or high risk (ex: diabetes, end stage renal disease, liver disease)   Tetanus (Td) Vaccine - COST NOT COVERED BY MEDICARE PART B Following completion of primary series, a booster dose should be given every 10 years to maintain immunity against tetanus  Td may also be given as tetanus wound prophylaxis  Tdap Vaccine - COST NOT COVERED BY MEDICARE PART B Recommended at least once for all adults  For pregnant patients, recommended with each pregnancy  Shingles Vaccine (Shingrix) - COST NOT COVERED BY MEDICARE PART B  2 shot series recommended in those aged 48 and above     Health Maintenance Due:      Topic Date Due    Colorectal Cancer Screening  04/01/2022    Hepatitis C Screening  Completed     Immunizations Due:      Topic Date Due    Pneumococcal Vaccine: 65+ Years (3 - PPSV23 or PCV20) 03/22/2020    Influenza Vaccine (1) 09/01/2022     Advance Directives   What are advance directives? Advance directives are legal documents that state your wishes and plans for medical care  These plans are made ahead of time in case you lose your ability to make decisions for yourself  Advance directives can apply to any medical decision, such as the treatments you want, and if you want to donate organs  What are the types of advance directives? There are many types of advance directives, and each state has rules about how to use them  You may choose a combination of any of the following:  · Living will: This is a written record of the treatment you want  You can also choose which treatments you do not want, which to limit, and which to stop at a certain time  This includes surgery, medicine, IV fluid, and tube feedings     · Durable power of  for [facility]): This is a written record that states who you want to make healthcare choices for you when you are unable to make them for yourself  This person, called a proxy, is usually a family member or a friend  You may choose more than 1 proxy  · Do not resuscitate (DNR) order:  A DNR order is used in case your heart stops beating or you stop breathing  It is a request not to have certain forms of treatment, such as CPR  A DNR order may be included in other types of advance directives  · Medical directive: This covers the care that you want if you are in a coma, near death, or unable to make decisions for yourself  You can list the treatments you want for each condition  Treatment may include pain medicine, surgery, blood transfusions, dialysis, IV or tube feedings, and a ventilator (breathing machine)  · Values history: This document has questions about your views, beliefs, and how you feel and think about life  This information can help others choose the care that you would choose  Why are advance directives important? An advance directive helps you control your care  Although spoken wishes may be used, it is better to have your wishes written down  Spoken wishes can be misunderstood, or not followed  Treatments may be given even if you do not want them  An advance directive may make it easier for your family to make difficult choices about your care  Weight Management   Why it is important to manage your weight:  Being overweight increases your risk of health conditions such as heart disease, high blood pressure, type 2 diabetes, and certain types of cancer  It can also increase your risk for osteoarthritis, sleep apnea, and other respiratory problems  Aim for a slow, steady weight loss  Even a small amount of weight loss can lower your risk of health problems  How to lose weight safely:  A safe and healthy way to lose weight is to eat fewer calories and get regular exercise   You can lose up about 1 pound a week by decreasing the number of calories you eat by 500 calories each day  Healthy meal plan for weight management:  A healthy meal plan includes a variety of foods, contains fewer calories, and helps you stay healthy  A healthy meal plan includes the following:  · Eat whole-grain foods more often  A healthy meal plan should contain fiber  Fiber is the part of grains, fruits, and vegetables that is not broken down by your body  Whole-grain foods are healthy and provide extra fiber in your diet  Some examples of whole-grain foods are whole-wheat breads and pastas, oatmeal, brown rice, and bulgur  · Eat a variety of vegetables every day  Include dark, leafy greens such as spinach, kale, johny greens, and mustard greens  Eat yellow and orange vegetables such as carrots, sweet potatoes, and winter squash  · Eat a variety of fruits every day  Choose fresh or canned fruit (canned in its own juice or light syrup) instead of juice  Fruit juice has very little or no fiber  · Eat low-fat dairy foods  Drink fat-free (skim) milk or 1% milk  Eat fat-free yogurt and low-fat cottage cheese  Try low-fat cheeses such as mozzarella and other reduced-fat cheeses  · Choose meat and other protein foods that are low in fat  Choose beans or other legumes such as split peas or lentils  Choose fish, skinless poultry (chicken or turkey), or lean cuts of red meat (beef or pork)  Before you cook meat or poultry, cut off any visible fat  · Use less fat and oil  Try baking foods instead of frying them  Add less fat, such as margarine, sour cream, regular salad dressing and mayonnaise to foods  Eat fewer high-fat foods  Some examples of high-fat foods include french fries, doughnuts, ice cream, and cakes  · Eat fewer sweets  Limit foods and drinks that are high in sugar  This includes candy, cookies, regular soda, and sweetened drinks  Exercise:  Exercise at least 30 minutes per day on most days of the week  Some examples of exercise include walking, biking, dancing, and swimming  You can also fit in more physical activity by taking the stairs instead of the elevator or parking farther away from stores  Ask your healthcare provider about the best exercise plan for you  © Copyright [employer] 2018 Information is for End User's use only and may not be sold, redistributed or otherwise used for commercial purposes   All illustrations and images included in CareNotes® are the copyrighted property of [employer]  or [address] [place] [address]

## 2022-10-05 RX ORDER — SILODOSIN 8 MG/1
CAPSULE ORAL
COMMUNITY
Start: 2022-09-13 | End: 2023-09-12 | Stop reason: SDUPTHER

## 2022-10-05 RX ORDER — FINASTERIDE 5 MG/1
TABLET, FILM COATED ORAL
COMMUNITY
Start: 2022-09-26

## 2022-10-06 ENCOUNTER — OFFICE VISIT (OUTPATIENT)
Dept: FAMILY MEDICINE CLINIC | Facility: CLINIC | Age: 67
End: 2022-10-06
Payer: MEDICARE

## 2022-10-06 VITALS — BODY MASS INDEX: 30.8 KG/M2 | WEIGHT: 240 LBS | HEIGHT: 74 IN

## 2022-10-06 DIAGNOSIS — L91.8 SKIN TAG: Primary | ICD-10-CM

## 2022-10-06 DIAGNOSIS — Z23 INFLUENZA VACCINE ADMINISTERED: ICD-10-CM

## 2022-10-06 PROCEDURE — 90662 IIV NO PRSV INCREASED AG IM: CPT | Performed by: INTERNAL MEDICINE

## 2022-10-06 PROCEDURE — 99213 OFFICE O/P EST LOW 20 MIN: CPT | Performed by: INTERNAL MEDICINE

## 2022-10-06 PROCEDURE — G0008 ADMIN INFLUENZA VIRUS VAC: HCPCS | Performed by: INTERNAL MEDICINE

## 2022-10-06 NOTE — PROGRESS NOTES
Skin tag removal    Date/Time: 10/6/2022 3:18 PM  Performed by: Iliana Izaguirre MD  Authorized by: Iliana Izaguirre MD   Universal Protocol:  Consent: Verbal consent obtained    Consent given by: patient  Patient understanding: patient states understanding of the procedure being performed  Patient consent: the patient's understanding of the procedure matches consent given        Lesion 6:      Pt with annoying skin tag behind left ear, interfering with hearing aid and glasses placement-used 1% lidocaine for numbing after cleaning the area with betadine and skin tag easily snipped and removed-bleeding controlled using cautery

## 2022-11-23 DIAGNOSIS — J32.9 SINUSITIS, UNSPECIFIED CHRONICITY, UNSPECIFIED LOCATION: Primary | ICD-10-CM

## 2022-11-23 RX ORDER — AMOXICILLIN AND CLAVULANATE POTASSIUM 875; 125 MG/1; MG/1
1 TABLET, FILM COATED ORAL EVERY 12 HOURS SCHEDULED
Qty: 20 TABLET | Refills: 0 | Status: SHIPPED | OUTPATIENT
Start: 2022-11-23 | End: 2022-12-03

## 2023-01-05 ENCOUNTER — APPOINTMENT (EMERGENCY)
Dept: CT IMAGING | Facility: HOSPITAL | Age: 68
End: 2023-01-05

## 2023-01-05 ENCOUNTER — HOSPITAL ENCOUNTER (EMERGENCY)
Facility: HOSPITAL | Age: 68
Discharge: HOME | End: 2023-01-05
Attending: EMERGENCY MEDICINE

## 2023-01-05 VITALS
OXYGEN SATURATION: 93 % | TEMPERATURE: 97.9 F | RESPIRATION RATE: 18 BRPM | SYSTOLIC BLOOD PRESSURE: 110 MMHG | DIASTOLIC BLOOD PRESSURE: 72 MMHG | HEART RATE: 91 BPM

## 2023-01-05 DIAGNOSIS — R17 ELEVATED BILIRUBIN: ICD-10-CM

## 2023-01-05 DIAGNOSIS — R19.7 DIARRHEA: ICD-10-CM

## 2023-01-05 DIAGNOSIS — D58.2 ELEVATED HEMOGLOBIN (HCC): ICD-10-CM

## 2023-01-05 DIAGNOSIS — N40.0 ENLARGED PROSTATE: ICD-10-CM

## 2023-01-05 DIAGNOSIS — R11.2 NAUSEA AND VOMITING: Primary | ICD-10-CM

## 2023-01-05 DIAGNOSIS — D69.6 THROMBOCYTOPENIA (HCC): ICD-10-CM

## 2023-01-05 LAB
ALBUMIN SERPL BCP-MCNC: 3.9 G/DL (ref 3.5–5)
ALP SERPL-CCNC: 83 U/L (ref 34–104)
ALT SERPL W P-5'-P-CCNC: 15 U/L (ref 7–52)
ANION GAP SERPL CALCULATED.3IONS-SCNC: 10 MMOL/L (ref 4–13)
AST SERPL W P-5'-P-CCNC: 17 U/L (ref 13–39)
BASOPHILS # BLD AUTO: 0.02 THOUSANDS/ÂΜL (ref 0–0.1)
BASOPHILS NFR BLD AUTO: 0 % (ref 0–1)
BILIRUB DIRECT SERPL-MCNC: 0.22 MG/DL (ref 0–0.2)
BILIRUB SERPL-MCNC: 1.38 MG/DL (ref 0.2–1)
BUN SERPL-MCNC: 27 MG/DL (ref 5–25)
CALCIUM SERPL-MCNC: 8.8 MG/DL (ref 8.4–10.2)
CHLORIDE SERPL-SCNC: 102 MMOL/L (ref 96–108)
CO2 SERPL-SCNC: 25 MMOL/L (ref 21–32)
CREAT SERPL-MCNC: 0.93 MG/DL (ref 0.6–1.3)
EOSINOPHIL # BLD AUTO: 0.01 THOUSAND/ÂΜL (ref 0–0.61)
EOSINOPHIL NFR BLD AUTO: 0 % (ref 0–6)
ERYTHROCYTE [DISTWIDTH] IN BLOOD BY AUTOMATED COUNT: 12.3 % (ref 11.6–15.1)
FLUAV RNA RESP QL NAA+PROBE: NEGATIVE
FLUBV RNA RESP QL NAA+PROBE: NEGATIVE
GFR SERPL CREATININE-BSD FRML MDRD: 84 ML/MIN/1.73SQ M
GLUCOSE SERPL-MCNC: 132 MG/DL (ref 65–140)
HCT VFR BLD AUTO: 46 % (ref 36.5–49.3)
HGB BLD-MCNC: 17.2 G/DL (ref 12–17)
IMM GRANULOCYTES # BLD AUTO: 0.02 THOUSAND/UL (ref 0–0.2)
IMM GRANULOCYTES NFR BLD AUTO: 0 % (ref 0–2)
LIPASE SERPL-CCNC: 16 U/L (ref 11–82)
LYMPHOCYTES # BLD AUTO: 0.54 THOUSANDS/ÂΜL (ref 0.6–4.47)
LYMPHOCYTES NFR BLD AUTO: 6 % (ref 14–44)
MCH RBC QN AUTO: 33.9 PG (ref 26.8–34.3)
MCHC RBC AUTO-ENTMCNC: 37.4 G/DL (ref 31.4–37.4)
MCV RBC AUTO: 91 FL (ref 82–98)
MONOCYTES # BLD AUTO: 0.31 THOUSAND/ÂΜL (ref 0.17–1.22)
MONOCYTES NFR BLD AUTO: 3 % (ref 4–12)
NEUTROPHILS # BLD AUTO: 8.47 THOUSANDS/ÂΜL (ref 1.85–7.62)
NEUTS SEG NFR BLD AUTO: 91 % (ref 43–75)
NRBC BLD AUTO-RTO: 0 /100 WBCS
PLATELET # BLD AUTO: 148 THOUSANDS/UL (ref 149–390)
PMV BLD AUTO: 9.7 FL (ref 8.9–12.7)
POTASSIUM SERPL-SCNC: 3.5 MMOL/L (ref 3.5–5.3)
PROT SERPL-MCNC: 6.9 G/DL (ref 6.4–8.4)
RBC # BLD AUTO: 5.08 MILLION/UL (ref 3.88–5.62)
RSV RNA RESP QL NAA+PROBE: NEGATIVE
SARS-COV-2 RNA RESP QL NAA+PROBE: NEGATIVE
SODIUM SERPL-SCNC: 137 MMOL/L (ref 135–147)
WBC # BLD AUTO: 9.37 THOUSAND/UL (ref 4.31–10.16)

## 2023-01-05 RX ORDER — ONDANSETRON 2 MG/ML
4 INJECTION INTRAMUSCULAR; INTRAVENOUS ONCE
Status: COMPLETED | OUTPATIENT
Start: 2023-01-05 | End: 2023-01-05

## 2023-01-05 RX ORDER — KETOROLAC TROMETHAMINE 30 MG/ML
15 INJECTION, SOLUTION INTRAMUSCULAR; INTRAVENOUS ONCE
Status: COMPLETED | OUTPATIENT
Start: 2023-01-05 | End: 2023-01-05

## 2023-01-05 RX ORDER — ONDANSETRON 4 MG/1
4 TABLET, ORALLY DISINTEGRATING ORAL EVERY 6 HOURS PRN
Qty: 20 TABLET | Refills: 0 | Status: SHIPPED | OUTPATIENT
Start: 2023-01-05

## 2023-01-05 RX ADMIN — ONDANSETRON 4 MG: 2 INJECTION INTRAMUSCULAR; INTRAVENOUS at 10:28

## 2023-01-05 RX ADMIN — IOHEXOL 100 ML: 350 INJECTION, SOLUTION INTRAVENOUS at 11:37

## 2023-01-05 RX ADMIN — SODIUM CHLORIDE 1000 ML: 0.9 INJECTION, SOLUTION INTRAVENOUS at 10:25

## 2023-01-05 RX ADMIN — KETOROLAC TROMETHAMINE 15 MG: 30 INJECTION, SOLUTION INTRAMUSCULAR at 10:30

## 2023-01-05 NOTE — ED NOTES
Ambulatory pulse ox steady between 93-94% and 83 bpm  Patient denies any shortness of breath while ambulating        Sharona Luna  01/05/23 [id number]

## 2023-01-05 NOTE — DISCHARGE INSTRUCTIONS
Come back to the emergency department for new or worsening symptoms including but not limited to chest pain, shortness of breath  Follow with your primary care regarding enlarged prostate, elevated red blood cell count, slightly decreased platelet count and elevated bilirubin

## 2023-01-05 NOTE — ED PROVIDER NOTES
History  Chief Complaint   Patient presents with   • Vomiting     Reports vomiting and diarrhea since last night  Amo sick after eating at a restaurant for dinner last night  Able to take sips of water  Pepto bismol taken this morning  76 y/o male presents with nausea, vomiting, diarrhea since last night  Approximately 3 episodes of emesis and 2 episodes of diarrhea  Nonbloody and nonbilious  Notes abdominal pain that is diffuse  Worse in the right lower quadrant  Patient's son who ate with him yesterday also noted some diarrhea but no nausea or vomiting  Patient denies shortness of breath or history of lung disease/tobacco abuse but is noted to be saturating 90% - 94% on room air  Denies aspiration  Denies chest pain  Patient symptoms started yesterday  Symptoms of myalgias, headache, fatigue  Headache not maximal in onset  Not worst headache of life  Denies neurological deficits  Last headache similar to this was 2 days ago  Patient notes that prior to 2 days ago he did not regularly get headaches  Has a history of pituitary adenoma  Hx of Hep B, DM, asthma, HTN, hypothyroidism, pituitary adenoma  Vomiting  Severity:  Moderate  Timing:  Constant  Quality:  Stomach contents  Chronicity:  New  Ineffective treatments:  None tried  Associated symptoms: abdominal pain and diarrhea    Diarrhea  Severity:  Mild  Onset quality:  Gradual  Associated symptoms: abdominal pain and vomiting        Prior to Admission Medications   Prescriptions Last Dose Informant Patient Reported? Taking?    Biotin 32816 MCG TABS   Yes No   Calcium Carb-Cholecalciferol (CALCIUM/VITAMIN D PO)   Yes No   Cholecalciferol (Vitamin D3) 50 MCG (2000 UT) TABS   Yes No   Sig: Take 2,000 Units by mouth daily   Polyethyl Glycol-Propyl Glycol (SYSTANE OP)   Yes No   Silodosin 8 MG CAPS   Yes No   VERAPAMIL HCL PO   Yes No   Si po daily   acetaminophen (TYLENOL) 650 mg CR tablet   Yes No   Sig: Take 650 mg by mouth every 8 (eight) hours as needed   finasteride (PROSCAR) 5 mg tablet   Yes No   lidocaine (LMX) 4 % cream   No No   Sig: Apply topically 2 (two) times a day   omeprazole (PriLOSEC) 20 mg delayed release capsule   Yes No   Sig: Take 20 mg by mouth daily   primidone (MYSOLINE) 50 mg tablet   Yes No   Sig: Take 150 mg by mouth 2 (two) times a day      Facility-Administered Medications: None       Past Medical History:   Diagnosis Date   • Alkaline phosphatase raised    • Arthralgia of foot     arthralgia of the ankle and/or foot   • Arthritis    • Aspirin allergy    • Asthma    • Chronic back pain    • Cobalamin deficiency    • Displacement of cervical intervertebral disc without myelopathy    • Edema of lower extremity    • Gallstone    • GERD (gastroesophageal reflux disease)    • Heart murmur    • Hepatitis B    • History of diabetes mellitus    • History of echocardiogram 02/23/2017    Nondilated LV with well-preserved systolic function and estimated EF of 50%  Mild septal dyskinesis was noted with otherwise normal wall motion  Moderate to severe LVH was noted with evidence of grade I diastolic dysfunction  Aortic valve sclerosis and mitral annular calcifcation were present with no evidence of significant valvular stenosis  Color Doppler examination as remarkable for trace MR, 1   • History of EKG 09/15/2017    Sinus rhythm with 1st degree AV block, Left bundle branch block, Abnormal ECG  EKG 5/25/17-Sinus rhythm with 1st degree AV block, Sinus rhythm with occasional premature ventricular complexes and fusion complexes  Cannot rule out Anteroseptal infarct, age undertermind  T wave abnormality, consider lateral ischemia abnormal ECG  EKG 3/24/17-Sinus rhythm with 1st degree AV block with occasional giorgio   • History of stress test 03/2017     Mildly abnormal study with evidence of a fixed inferoseptal defect   This is most likely related to attenuation artifact, however, the possibility of a nontransmural myocardial infarction involving these segments cannot be completely excluded  Quantitative analysis indicates equivocal ischemia in the mid segment of the septum  This is most likely related to artifacts  Gated wall motion analysis wa   • Hypertension    • Hypoglycemia    • Hypothyroidism    • Increased body mass index    • Induration penis plastica    • Mononeuritis    • Neuropathy    • Non-alcoholic fatty liver disease    • Obesity    • Osteopenia    • Osteoporosis    • Palpitations    • Pituitary adenoma ([facility] [address] )    • Pneumonia    • Sebaceous cyst     infection of sebaceous cyst   • Testicular hypofunction    • Thrombocytopenic disorder ([facility] [address] )    • Tremor    • Vitamin D deficiency        Past Surgical History:   Procedure Laterality Date   • BACK SURGERY  2010 2010- Fusion L4-L5-S1   • COLOGUAVLADIMIR (HISTORICAL)  04/01/2019    Negative   • CYST REMOVAL  1987 1987- Sebaceous cyst removed from back   • EGD  07/15/2019   • EGD  04/03/2018   • HERNIA REPAIR  1957   • LAMINECTOMY      Laminectomy with internal fixation L4-S1   • LIPOMA RESECTION Right 2014    abdomen   • LUMBAR FUSION  2010    L4-L5-S1   • MOLE REMOVAL      back and face   • TOENAIL EXCISION Left 1993       Family History   Problem Relation Age of Onset   • Atrial fibrillation Father    • Dementia Father    • Heart disease Father         pacemaker   • Stroke Father    • Coronary artery disease Father    • Aortic stenosis Father    • Other Father         muscle atrophy     I have reviewed and agree with the history as documented      E-Cigarette/Vaping   • E-Cigarette Use Never User      E-Cigarette/Vaping Substances   • Nicotine No    • THC No    • CBD No    • Flavoring No    • Other No    • Unknown No      Social History     Tobacco Use   • Smoking status: Never   • Smokeless tobacco: Never   Vaping Use   • Vaping Use: Never used   Substance Use Topics   • Alcohol use: Not Currently   • Drug use: Not Currently     Comment: No use       Review of Systems Gastrointestinal: Positive for abdominal pain, diarrhea and vomiting  All other systems reviewed and are negative  Physical Exam  Physical Exam  Vitals and nursing note reviewed  Constitutional:       General: He is not in acute distress  Appearance: He is well-developed  He is not diaphoretic  HENT:      Head: Normocephalic and atraumatic  Right Ear: External ear normal       Left Ear: External ear normal    Eyes:      Conjunctiva/sclera: Conjunctivae normal    Neck:      Trachea: No tracheal deviation  Cardiovascular:      Rate and Rhythm: Normal rate and regular rhythm  Heart sounds: Normal heart sounds  No murmur heard  Pulmonary:      Effort: No respiratory distress  Breath sounds: Normal breath sounds  No stridor  No wheezing or rales  Abdominal:      General: Bowel sounds are normal  There is no distension  Palpations: Abdomen is soft  There is no mass  Tenderness: There is abdominal tenderness (RLQ)  There is no guarding or rebound  Musculoskeletal:         General: No tenderness or deformity  Skin:     General: Skin is dry  Findings: No rash  Neurological:      Motor: No abnormal muscle tone  Coordination: Coordination normal    Psychiatric:         Behavior: Behavior normal          Thought Content:  Thought content normal          Judgment: Judgment normal          Vital Signs  ED Triage Vitals   Temperature Pulse Respirations Blood Pressure SpO2   01/05/23 0953 01/05/23 0953 01/05/23 0953 01/05/23 1009 01/05/23 0953   97 9 °F (36 6 °C) 94 19 113/77 95 %      Temp src Heart Rate Source Patient Position - Orthostatic VS BP Location FiO2 (%)   -- -- -- -- --             Pain Score       --                  Vitals:    01/05/23 0953 01/05/23 1009 01/05/23 1114 01/05/23 1328   BP:  113/77 107/65 110/72   Pulse: 94  90 91         Visual Acuity      ED Medications  Medications   sodium chloride 0 9 % bolus 1,000 mL (0 mL Intravenous Stopped 1/5/23 1225)   ondansetron (ZOFRAN) injection 4 mg (4 mg Intravenous Given 1/5/23 1028)   ketorolac (TORADOL) injection 15 mg (15 mg Intravenous Given 1/5/23 1030)   iohexol (OMNIPAQUE) 350 MG/ML injection (SINGLE-DOSE) 100 mL (100 mL Intravenous Given 1/5/23 1137)       Diagnostic Studies  Results Reviewed     Procedure Component Value Units Date/Time    CBC and differential [[id number]]  (Abnormal) Collected: 01/05/23 1031    Lab Status: Final result Specimen: Blood from Arm, Right Updated: 01/05/23 1156     WBC 9 37 Thousand/uL      RBC 5 08 Million/uL      Hemoglobin 17 2 g/dL      Hematocrit 46 0 %      MCV 91 fL      MCH 33 9 pg      MCHC 37 4 g/dL      RDW 12 3 %      MPV 9 7 fL      Platelets 896 Thousands/uL      nRBC 0 /100 WBCs      Neutrophils Relative 91 %      Immat GRANS % 0 %      Lymphocytes Relative 6 %      Monocytes Relative 3 %      Eosinophils Relative 0 %      Basophils Relative 0 %      Neutrophils Absolute 8 47 Thousands/µL      Immature Grans Absolute 0 02 Thousand/uL      Lymphocytes Absolute 0 54 Thousands/µL      Monocytes Absolute 0 31 Thousand/µL      Eosinophils Absolute 0 01 Thousand/µL      Basophils Absolute 0 02 Thousands/µL     FLU/RSV/COVID - if FLU/RSV clinically relevant [[id number]]  (Normal) Collected: 01/05/23 1031    Lab Status: Final result Specimen: Nares from Nose Updated: 01/05/23 1114     SARS-CoV-2 Negative     INFLUENZA A PCR Negative     INFLUENZA B PCR Negative     RSV PCR Negative    Narrative:      FOR PEDIATRIC PATIENTS - copy/paste COVID Guidelines URL to browser: [email or web address]  [employer]    SARS-CoV-2 assay is a Nucleic Acid Amplification assay intended for the  qualitative detection of nucleic acid from SARS-CoV-2 in nasopharyngeal  swabs  Results are for the presumptive identification of SARS-CoV-2 RNA      Positive results are indicative of infection with SARS-CoV-2, the virus  causing COVID-19, but do not rule out bacterial infection or co-infection  with other viruses  Laboratories within the [place] and its  territories are required to report all positive results to the appropriate  public health authorities  Negative results do not preclude SARS-CoV-2  infection and should not be used as the sole basis for treatment or other  patient management decisions  Negative results must be combined with  clinical observations, patient history, and epidemiological information  This test has not been FDA cleared or approved  This test has been authorized by FDA under an Emergency Use Authorization  (EUA)  This test is only authorized for the duration of time the  declaration that circumstances exist justifying the authorization of the  emergency use of an in vitro diagnostic tests for detection of SARS-CoV-2  virus and/or diagnosis of COVID-19 infection under section 564(b)(1) of  the Act, 21 U  S C  731VFC-2(N)(1), unless the authorization is terminated  or revoked sooner  The test has been validated but independent review by FDA  and CLIA is pending  Test performed using [employer] Gene[employer]pert: This RT-PCR assay targets N2,  a region unique to SARS-CoV-2  A conserved region in the E-gene was chosen  for pan-Sarbecovirus detection which includes SARS-CoV-2  According to CMS-2020-01-R, this platform meets the definition of high-throughput technology      Basic metabolic panel [[id number]]  (Abnormal) Collected: 01/05/23 1031    Lab Status: Final result Specimen: Blood from Arm, Right Updated: 01/05/23 1057     Sodium 137 mmol/L      Potassium 3 5 mmol/L      Chloride 102 mmol/L      CO2 25 mmol/L      ANION GAP 10 mmol/L      BUN 27 mg/dL      Creatinine 0 93 mg/dL      Glucose 132 mg/dL      Calcium 8 8 mg/dL      eGFR 84 ml/min/1 73sq m     Narrative:      [place] guidelines for Chronic Kidney Disease (CKD):   •  Stage 1 with normal or high GFR (GFR > 90 mL/min/1 73 square meters)  •  Stage 2 Mild CKD (GFR = 60-89 mL/min/1 73 square meters)  •  Stage 3A Moderate CKD (GFR = 45-59 mL/min/1 73 square meters)  •  Stage 3B Moderate CKD (GFR = 30-44 mL/min/1 73 square meters)  •  Stage 4 Severe CKD (GFR = 15-29 mL/min/1 73 square meters)  •  Stage 5 End Stage CKD (GFR <15 mL/min/1 73 square meters)  Note: GFR calculation is accurate only with a steady state creatinine    Hepatic function panel [[id number]]  (Abnormal) Collected: 01/05/23 1031    Lab Status: Final result Specimen: Blood from Arm, Right Updated: 01/05/23 1057     Total Bilirubin 1 38 mg/dL      Bilirubin, Direct 0 22 mg/dL      Alkaline Phosphatase 83 U/L      AST 17 U/L      ALT 15 U/L      Total Protein 6 9 g/dL      Albumin 3 9 g/dL     Lipase [[id number]]  (Normal) Collected: 01/05/23 1031    Lab Status: Final result Specimen: Blood from Arm, Right Updated: 01/05/23 1057     Lipase 16 u/L                  CT pe study w abdomen pelvis w contrast   Final Result by Neymar Thomas MD (01/05 1232)      1  No evidence of pulmonary embolus  2   Subsegmental atelectasis and/or scarring in both lower lobes  3   Otherwise, no acute abnormality in the chest, abdomen or pelvis  4   Mild dilatation of the central pulmonary arteries, suggesting pulmonary arterial hypertension  5   3 5 cm duodenal diverticulum  6   Colonic diverticulosis without evidence of acute diverticulitis  7   2 mm nonobstructing left renal calculus  8   Prostatomegaly  Workstation performed: [id number]         CT head without contrast   Final Result by Dayna Recio MD (01/05 1232)      No evidence of acute intracranial process  Chronic microangiopathy  10 x 6 x 9 mm pituitary lesion unchanged as far back as May 2014 allowing for difference in imaging modalities                          Workstation performed: [id number]                    Procedures  Procedures         ED Course Medical Decision Making  Patient with abdominal pain, near hypoxia  Likely viral illness vs food poisoning regarding N/V/ Diarrhea  Will eval for acute intra-abdominal and thoracic abnormality  CT with multiple non-acute findings  Enlarged prostate, diiverticulosis virginie enlarged pulmonary arteray and scarring of bottom of lungs and small bowel diverticula  Mildly elevated bilirubin likely from vomiting  Hgb  Elevated  Hx of pneumonia per patient  Likely cause of lower scarring on lungs and of O2 that occasional goes to low 90s  While on a good pleth lowest O2 I noted was 90  Ambulated patient  Lowest O2 is 92  Patient has no CP or SOB  No PE on CT scan  Will plan on discharge  Strict RTED precautions given  Diarrhea: acute illness or injury  Elevated bilirubin: acute illness or injury  Elevated hemoglobin ([facility] [address] ): acute illness or injury  Enlarged prostate: acute illness or injury  Nausea and vomiting: acute illness or injury  Thrombocytopenia ([facility] [address] ): self-limited or minor problem  Amount and/or Complexity of Data Reviewed  External Data Reviewed: labs and notes  Labs: ordered  Details: baseline Hgb  Radiology: ordered  Risk  Prescription drug management  Decision regarding hospitalization            Disposition  Final diagnoses:   Elevated bilirubin   Elevated hemoglobin (HCC)   Thrombocytopenia (HCC)   Enlarged prostate   Nausea and vomiting   Diarrhea     Time reflects when diagnosis was documented in both MDM as applicable and the Disposition within this note     Time User Action Codes Description Comment    1/5/2023  1:24 PM Tennis Fridge Add [R17] Elevated bilirubin     1/5/2023  1:24 PM Nathanel Gash [D58 2] Elevated hemoglobin ([facility] [address] )     1/5/2023  1:24 PM Nathanel Gash [D69 6] Thrombocytopenia ([facility] [address] )     1/5/2023  1:27 PM Tennis Fridge Add [N40 0] Enlarged prostate     1/5/2023  1:33 PM Tennis Fridge Add [R11 2] Nausea and vomiting     1/5/2023  1:33 PM Meme Handing Add [R19 7] Diarrhea     1/5/2023  1:33 PM Meme Handing Modify [R17] Elevated bilirubin     1/5/2023  1:33 PM Meme Handing Modify [R11 2] Nausea and vomiting       ED Disposition     ED Disposition   Discharge    Condition   Stable    Date/Time   Thu Jan 5, 2023  1:32 PM    Comment   Nahomy Sheltonidalmis discharge to home/self care                 Follow-up Information     Follow up With Specialties Details Why Contact Info Additional Information    Jewel Rascon MD Internal Medicine, Pediatrics  For re-evaluation as soon as possible [address]  [address] [address] Emergency Department Emergency Medicine  If symptoms worsen [address] [facility],[address] [id number]  [address] [place] [address] Emergency Department, [address], [address] [place] [address]          Discharge Medication List as of 1/5/2023  1:35 PM      START taking these medications    Details   ondansetron (ZOFRAN-ODT) 4 mg disintegrating tablet Take 1 tablet (4 mg total) by mouth every 6 (six) hours as needed for nausea or vomiting, Starting Thu 1/5/2023, Normal         CONTINUE these medications which have NOT CHANGED    Details   acetaminophen (TYLENOL) 650 mg CR tablet Take 650 mg by mouth every 8 (eight) hours as needed, Historical Med      Biotin 70599 MCG TABS Historical Med      Calcium Carb-Cholecalciferol (CALCIUM/VITAMIN D PO) Historical Med      Cholecalciferol (Vitamin D3) 50 MCG (2000 UT) TABS Take 2,000 Units by mouth daily, Historical Med      finasteride (PROSCAR) 5 mg tablet Starting Mon 9/26/2022, Historical Med      lidocaine (LMX) 4 % cream Apply topically 2 (two) times a day, Starting Tue 6/22/2021, Normal      omeprazole (PriLOSEC) 20 mg delayed release capsule Take 20 mg by mouth daily, Historical Med      Polyethyl Glycol-Propyl Glycol (SYSTANE OP) Historical Med primidone (MYSOLINE) 50 mg tablet Take 150 mg by mouth 2 (two) times a day, Starting Wed 4/7/2021, Until Thu 9/8/2022, Historical Med      Silodosin 8 MG CAPS Starting Tue 9/13/2022, Historical Med      VERAPAMIL HCL PO 1 po daily, Historical Med             No discharge procedures on file      PDMP Review       Value Time User    PDMP Reviewed  Yes 6/22/2021 11:36 AM Marybeth Campbell          ED Provider  Electronically Signed by           Abe Mireles DO  01/05/23 1413

## 2023-01-11 ENCOUNTER — OFFICE VISIT (OUTPATIENT)
Dept: FAMILY MEDICINE CLINIC | Facility: CLINIC | Age: 68
End: 2023-01-11

## 2023-01-11 VITALS
DIASTOLIC BLOOD PRESSURE: 90 MMHG | WEIGHT: 256.5 LBS | HEIGHT: 74 IN | SYSTOLIC BLOOD PRESSURE: 138 MMHG | RESPIRATION RATE: 16 BRPM | TEMPERATURE: 97.8 F | HEART RATE: 95 BPM | BODY MASS INDEX: 32.92 KG/M2 | OXYGEN SATURATION: 99 %

## 2023-01-11 DIAGNOSIS — M79.604 PAIN IN BOTH LOWER EXTREMITIES: ICD-10-CM

## 2023-01-11 DIAGNOSIS — Z23 23-POLYVALENT PNEUMOCOCCAL POLYSACCHARIDE VACCINE ADMINISTERED: ICD-10-CM

## 2023-01-11 DIAGNOSIS — Z12.11 COLON CANCER SCREENING: ICD-10-CM

## 2023-01-11 DIAGNOSIS — Z09 HOSPITAL DISCHARGE FOLLOW-UP: Primary | ICD-10-CM

## 2023-01-11 DIAGNOSIS — M79.605 PAIN IN BOTH LOWER EXTREMITIES: ICD-10-CM

## 2023-01-11 RX ORDER — VITAMIN B COMPLEX
1 CAPSULE ORAL DAILY
COMMUNITY

## 2023-01-11 RX ORDER — NAPROXEN 500 MG/1
500 TABLET ORAL
Qty: 30 TABLET | Refills: 3 | Status: SHIPPED | OUTPATIENT
Start: 2023-01-11

## 2023-01-11 NOTE — PROGRESS NOTES
Assessment/Plan: Seems as though Cindy Menard had a gastroenteritis-was in the ER and had some labs and imaging that we discussed in detail-he is going to bring up the assumed [address] with his Cardiologist at [facility]-otherwise seems resolved-he and his roommate Nitin Gabriel did bring up some phone issues they've been having with us-trying to get through and not being able to-trouble trying to set up an appt on My Chart with me-we talked about things and I told Cindy Menard and Nitin Gabriel I would bring up and follow up at staff meetings         Problem List Items Addressed This Visit    None  Visit Diagnoses     Hospital discharge follow-up    -  Primary    23-polyvalent pneumococcal polysaccharide vaccine administered        Relevant Orders    PNEUMOCOCCAL POLYSACCHARIDE VACCINE 23-VALENT =>1YO SQ IM (Completed)    Colon cancer screening        Relevant Orders    Cologuard    Pain in both lower extremities        Relevant Medications    naproxen (Naprosyn) 500 mg tablet            Subjective:      Patient ID: Alejandro Valdovinos is a 75 y o  male  Cindy Menard here for follow up from ER-he had eaten at a restaurant and developed abdominal pain, nausea, vomiting and diarrhea-went to the ER and had labs, flu/covid tests done and CT head and abd/pelvis-nothing acute came up-bilirubin slightly elevated, likely from dehydration-given fluids and zofran and much better now-thinks it may have been a stomach bug that he picked up somewhere-incidentally on his CT chest they saw his pulmonary artery as being dilated, possibly indicating pulmonary htn  We discussed this, and he is followed by Cardiology at [facility] and gets echos frequently so will discuss with him          The following portions of the patient's history were reviewed and updated as appropriate:   Past Medical History:  He has a past medical history of Alkaline phosphatase raised, Allergic (1964), Arthralgia of foot, Arthritis, Aspirin allergy, Asthma, Chronic back pain, Cobalamin deficiency, Coronary artery disease (2019), Displacement of cervical intervertebral disc without myelopathy, Edema of lower extremity, Gallstone, GERD (gastroesophageal reflux disease), Heart murmur, Hepatitis B, History of diabetes mellitus, History of echocardiogram (02/23/2017), History of EKG (09/15/2017), History of stress test (03/2017), HL (hearing loss) (2020), Hypertension, Hypoglycemia, Hypothyroidism, Increased body mass index, Induration penis plastica, Infectious viral hepatitis (1998), Mononeuritis, Neuropathy, Non-alcoholic fatty liver disease, Obesity, Osteopenia, Osteoporosis, Otitis media (09/2020), Palpitations, Pituitary adenoma ([facility] [address] ), Pneumonia, Scoliosis (1972), Sebaceous cyst, Testicular hypofunction, Thrombocytopenic disorder ([facility] [address] ), Tremor, Urinary tract infection, Visual impairment, and Vitamin D deficiency  ,  _______________________________________________________________________  Medical Problems:  does not have any pertinent problems on file ,  _______________________________________________________________________  Past Surgical History:   has a past surgical history that includes Lipoma resection (Right, 2014); Lumbar fusion (2010); Toenail excision (Left, 1993); Laminectomy; Mole removal; Hernia repair (9913); EGD (07/15/2019); EGD (04/03/2018); Back surgery (2010); Cyst Removal (1987); Cologuard (Historical) (04/01/2019); Cholecystectomy (?); Eye surgery (07/13/2022); Spine surgery (08/10/2010--02/26/2020); and Vasectomy (1976)  ,  _______________________________________________________________________  Family History:  family history includes Alcohol abuse in his father; Aortic stenosis in his father; Arthritis in his maternal grandmother and mother; Atrial fibrillation in his father; Coronary artery disease in his father; Dementia in his father; Hearing loss in his paternal grandfather; Heart disease in his father;  Other in his father; Stroke in his father; Vision loss in his mother ,  _______________________________________________________________________  Social History:   reports that he has never smoked  He has never used smokeless tobacco  He reports that he does not currently use alcohol  He reports that he does not use drugs  ,  _______________________________________________________________________  Allergies:  is allergic to carvedilol, metoprolol, aspirin, ciprofloxacin, clindamycin, duloxetine hcl, propranolol, and pollen extract     _______________________________________________________________________  Current Outpatient Medications   Medication Sig Dispense Refill   • acetaminophen (TYLENOL) 650 mg CR tablet Take 650 mg by mouth every 8 (eight) hours as needed     • b complex vitamins capsule Take 1 capsule by mouth daily     • Biotin 20590 MCG TABS      • Calcium Carb-Cholecalciferol (CALCIUM/VITAMIN D PO)      • Cholecalciferol (Vitamin D3) 50 MCG (2000 UT) TABS Take 2,000 Units by mouth daily     • finasteride (PROSCAR) 5 mg tablet      • lidocaine (LMX) 4 % cream Apply topically 2 (two) times a day 30 g 1   • naproxen (Naprosyn) 500 mg tablet Take 1 tablet (500 mg total) by mouth daily at bedtime as needed (leg pain) 30 tablet 3   • omeprazole (PriLOSEC) 20 mg delayed release capsule Take 20 mg by mouth daily     • Polyethyl Glycol-Propyl Glycol (SYSTANE OP)      • primidone (MYSOLINE) 50 mg tablet Take 150 mg by mouth 2 (two) times a day     • Silodosin 8 MG CAPS 1 po daily     • VERAPAMIL HCL PO 1 po daily     • ondansetron (ZOFRAN-ODT) 4 mg disintegrating tablet Take 1 tablet (4 mg total) by mouth every 6 (six) hours as needed for nausea or vomiting (Patient not taking: Reported on 1/11/2023) 20 tablet 0     No current facility-administered medications for this visit      _______________________________________________________________________  Review of Systems   Constitutional: Negative  HENT: Negative  Respiratory: Negative  Neurological: Negative  Hematological: Negative  Psychiatric/Behavioral: Negative  Objective:  Vitals:    01/11/23 1554   BP: 138/90   BP Location: Left arm   Patient Position: Sitting   Cuff Size: Large   Pulse: 95   Resp: 16   Temp: 97 8 °F (36 6 °C)   TempSrc: Temporal   SpO2: 99%   Weight: 116 kg (256 lb 8 oz)   Height: 6' 2" (1 88 m)     Body mass index is 32 93 kg/m²  Physical Exam  Constitutional:       Appearance: Normal appearance  HENT:      Head: Normocephalic and atraumatic  Right Ear: External ear normal       Left Ear: External ear normal       Nose: Nose normal    Pulmonary:      Effort: Pulmonary effort is normal    Neurological:      General: No focal deficit present  Mental Status: He is alert and oriented to person, place, and time

## 2023-01-25 LAB — COLOGUARD RESULT REPORTABLE: NEGATIVE

## 2023-02-14 ENCOUNTER — APPOINTMENT (OUTPATIENT)
Dept: LAB | Facility: HOSPITAL | Age: 68
End: 2023-02-14
Attending: SPECIALIST

## 2023-02-14 DIAGNOSIS — R97.20 ABNORMAL PSA: ICD-10-CM

## 2023-02-14 LAB — PSA SERPL-MCNC: 2 NG/ML (ref 0–4)

## 2023-06-12 PROCEDURE — 88305 TISSUE EXAM BY PATHOLOGIST: CPT | Performed by: PATHOLOGY

## 2023-06-14 ENCOUNTER — LAB REQUISITION (OUTPATIENT)
Dept: LAB | Facility: HOSPITAL | Age: 68
End: 2023-06-14
Payer: MEDICARE

## 2023-06-14 DIAGNOSIS — D48.5 NEOPLASM OF UNCERTAIN BEHAVIOR OF SKIN: ICD-10-CM

## 2023-06-22 PROCEDURE — 88305 TISSUE EXAM BY PATHOLOGIST: CPT | Performed by: PATHOLOGY

## 2023-06-26 PROCEDURE — 88305 TISSUE EXAM BY PATHOLOGIST: CPT | Performed by: PATHOLOGY

## 2023-06-28 ENCOUNTER — LAB REQUISITION (OUTPATIENT)
Dept: LAB | Facility: HOSPITAL | Age: 68
End: 2023-06-28
Payer: MEDICARE

## 2023-06-28 DIAGNOSIS — D48.5 NEOPLASM OF UNCERTAIN BEHAVIOR OF SKIN: ICD-10-CM

## 2023-07-06 PROCEDURE — 88305 TISSUE EXAM BY PATHOLOGIST: CPT | Performed by: PATHOLOGY

## 2023-07-10 PROCEDURE — 88305 TISSUE EXAM BY PATHOLOGIST: CPT | Performed by: PATHOLOGY

## 2023-07-12 ENCOUNTER — LAB REQUISITION (OUTPATIENT)
Dept: LAB | Facility: HOSPITAL | Age: 68
End: 2023-07-12
Payer: MEDICARE

## 2023-07-12 DIAGNOSIS — D48.5 NEOPLASM OF UNCERTAIN BEHAVIOR OF SKIN: ICD-10-CM

## 2023-07-17 PROCEDURE — 88305 TISSUE EXAM BY PATHOLOGIST: CPT | Performed by: PATHOLOGY

## 2023-07-18 NOTE — PROGRESS NOTES
Virtual Regular Visit      Assessment/Plan:    Problem List Items Addressed This Visit     None      Visit Diagnoses     Bronchitis    -  Primary    still waiting on covid send out, he is isolating, taking doxycycline-I suggested rest,fluids,zinc,Vit C + D and will rx albuterol hfa    Relevant Medications    albuterol (PROVENTIL HFA,VENTOLIN HFA) 90 mcg/act inhaler               Reason for visit is   Chief Complaint   Patient presents with    COVID-19     Patient was tested yesterday and was negative however they did a repeat swab and patient is waiting results  Patient has extreme fatigue, cough, sinus congestion/headache  Has been taking Mucinex ER 12 hour from [facility]  Also taking doxycyline   Other     Patient has to follow up with urology for Keflex  Unsure how long to take and if he should be taking with new prescription   Virtual Regular Visit        Encounter provider Ileana Moore MD    Provider located at [address] [place] [id number] [phone]      Recent Visits  Date Type Provider Dept   03/26/21 Telephone [facility] [address] recent visits within past 7 days and meeting all other requirements     Today's Visits  Date Type Provider Dept   03/29/21 Telemedicine Karel Phoenix, MD [facility] [address] [facility]   03/29/21 Telephone [id number] today's visits and meeting all other requirements     Future Appointments  No visits were found meeting these conditions  Showing future appointments within next 150 days and meeting all other requirements        The patient was identified by name and date of birth  Gala Alpers was informed that this is a telemedicine visit and that the visit is being conducted through [employer] and patient was informed that this is not a secure, HIPAA-compliant platform  He agrees to proceed     My office door was closed  No one else was in the room    He acknowledged consent and understanding of privacy and security of the video platform  The patient has agreed to participate and understands they can discontinue the visit at any time  Patient is aware this is a billable service  Subjective  Ger Diana is a 78 y o  male with cough and congestion   Ida Ying developed cough and sinus congestion Wed of last week ( a day after getting his first covid vaccine)-no fever-over the weekend got worse, [facility] did a rapid covid test which was negative, and also a send out, which isn't back yet-they thought he had some pneumonia on the cxr but official read isn't available yet-he has cough, congestion, diarrhea -was started on doxycycline and has been taking keflex as prescribed by Urology as well       Past Medical History:   Diagnosis Date    Alkaline phosphatase raised     Arthralgia of foot     arthralgia of the ankle and/or foot    Arthritis     Aspirin allergy     Asthma     Chronic back pain     Cobalamin deficiency     Displacement of cervical intervertebral disc without myelopathy     Edema of lower extremity     Gallstone     GERD (gastroesophageal reflux disease)     Heart murmur     Hepatitis B     History of diabetes mellitus     History of echocardiogram 02/23/2017    Nondilated LV with well-preserved systolic function and estimated EF of 50%  Mild septal dyskinesis was noted with otherwise normal wall motion  Moderate to severe LVH was noted with evidence of grade I diastolic dysfunction  Aortic valve sclerosis and mitral annular calcifcation were present with no evidence of significant valvular stenosis  Color Doppler examination as remarkable for trace MR, 1    History of EKG 09/15/2017    Sinus rhythm with 1st degree AV block, Left bundle branch block, Abnormal ECG  EKG 5/25/17-Sinus rhythm with 1st degree AV block, Sinus rhythm with occasional premature ventricular complexes and fusion complexes   Cannot rule out Anteroseptal infarct, age undertermind  T wave abnormality, consider lateral ischemia abnormal ECG  EKG 3/24/17-Sinus rhythm with 1st degree AV block with occasional giorgio    History of stress test 03/2017     Mildly abnormal study with evidence of a fixed inferoseptal defect  This is most likely related to attenuation artifact, however, the possibility of a nontransmural myocardial infarction involving these segments cannot be completely excluded  Quantitative analysis indicates equivocal ischemia in the mid segment of the septum  This is most likely related to artifacts   Gated wall motion analysis wa    Hypertension     Hypoglycemia     Hypothyroidism     Increased body mass index     Induration penis plastica     Mononeuritis     Neuropathy     Non-alcoholic fatty liver disease     Obesity     Osteopenia     Osteoporosis     Palpitations     Pituitary adenoma (HCC)     Pneumonia     Sebaceous cyst     infection of sebaceous cyst    Testicular hypofunction     Thrombocytopenic disorder ([facility] [address] )     Tremor     Vitamin D deficiency        Past Surgical History:   Procedure Laterality Date    BACK SURGERY  2010 2010- Fusion L4-L5-S1    COLOGUARD (HISTORICAL)  04/01/2019    Negative    CYST REMOVAL  1987 1987- Sebaceous cyst removed from back    EGD  07/15/2019    EGD  04/03/2018    HERNIA REPAIR  1957    LAMINECTOMY      Laminectomy with internal fixation L4-S1    LIPOMA RESECTION Right 2014    abdomen    LUMBAR FUSION  2010    L4-L5-S1    MOLE REMOVAL      back and face    TOENAIL EXCISION Left 1993       Current Outpatient Medications   Medication Sig Dispense Refill    acetaminophen (TYLENOL) 650 mg CR tablet Take 650 mg by mouth every 8 (eight) hours as needed      Biotin 12333 MCG TABS       Calcium Carb-Cholecalciferol (CALCIUM/VITAMIN D PO)       Cholecalciferol (Vitamin D3) 50 MCG (2000 UT) TABS Take 2,000 Units by mouth daily      finasteride (PROSCAR) 5 mg tablet Take 5 mg by mouth 2 (two) times a day       gabapentin (NEURONTIN) 300 mg capsule Take 1 capsule (300 mg total) by mouth 2 (two) times a day 60 capsule 3    Ginger, Zingiber officinalis, (Ginger Root) 500 MG CAPS Take 1 capsule by mouth 3 (three) times a day      Lactobacillus (ACIDOPHILUS PO) Take 1 tablet by mouth 2 (two) times a day      mometasone (Nasonex) 50 mcg/act nasal spray 2 sprays into each nostril daily      Naproxen Sodium (ALEVE PO) Take by mouth      nystatin (MYCOSTATIN) powder Apply 1 application topically 2 (two) times a day as needed      omeprazole (PriLOSEC) 20 mg delayed release capsule Take 20 mg by mouth daily      Polyethyl Glycol-Propyl Glycol (SYSTANE OP)       primidone (MYSOLINE) 50 mg tablet Take 100 mg by mouth daily at bedtime      tadalafil (Cialis) 20 MG tablet Take 20 mg by mouth      traMADol (ULTRAM) 50 mg tablet Take 50 mg by mouth daily at bedtime      VERAPAMIL HCL PO 1 po daily      albuterol (PROVENTIL HFA,VENTOLIN HFA) 90 mcg/act inhaler Inhale 2 puffs every 6 (six) hours as needed for wheezing or shortness of breath 1 Inhaler 5    tamsulosin (FLOMAX) 0 4 mg Take 1 capsule (0 4 mg total) by mouth daily with dinner (Patient not taking: Reported on 3/22/2021) 30 capsule 0    valACYclovir (VALTREX) 500 mg tablet Take 1 tablet (500 mg total) by mouth 2 (two) times a day for 5 days 10 tablet 0     No current facility-administered medications for this visit  Allergies   Allergen Reactions    Carvedilol Shortness Of Breath    Metoprolol Shortness Of Breath and Fatigue    Aspirin GI Intolerance    Ciprofloxacin Other (See Comments)     irregular heart rate    Clindamycin Other (See Comments)     diarrhea    Propranolol Other (See Comments)     Dizzy, lathargic    Pollen Extract Other (See Comments)     Itchy eyes, runny nose, scratchy throat       Review of Systems   Constitutional: Positive for fatigue  Negative for fever     HENT: Positive for congestion  Respiratory: Positive for cough  Negative for choking, shortness of breath and stridor  Cardiovascular: Negative for chest pain and leg swelling  Gastrointestinal: Positive for diarrhea  Musculoskeletal: Positive for arthralgias  Video Exam    Vitals:    03/29/21 0938   Temp: (!) 97 4 °F (36 3 °C)   TempSrc: Oral   Weight: 128 kg (283 lb)   Height: 6' 2" (1 88 m)       Physical Exam  Constitutional:       Appearance: He is obese  HENT:      Head: Normocephalic and atraumatic  Mouth/Throat:      Mouth: Mucous membranes are moist    Neck:      Musculoskeletal: Normal range of motion and neck supple  Pulmonary:      Effort: Pulmonary effort is normal    Neurological:      General: No focal deficit present  Mental Status: He is oriented to person, place, and time  Mental status is at baseline  I spent 20 minutes directly with the patient during this visit      VIRTUAL VISIT DISCLAIMER    Tigre Betts acknowledges that he has consented to an online visit or consultation  He understands that the online visit is based solely on information provided by him, and that, in the absence of a face-to-face physical evaluation by the physician, the diagnosis he receives is both limited and provisional in terms of accuracy and completeness  This is not intended to replace a full medical face-to-face evaluation by the physician  Tigre Betts understands and accepts these terms  none

## 2023-08-25 ENCOUNTER — TELEPHONE (OUTPATIENT)
Dept: FAMILY MEDICINE CLINIC | Facility: CLINIC | Age: 68
End: 2023-08-25

## 2023-08-25 DIAGNOSIS — J32.9 SINUSITIS, UNSPECIFIED CHRONICITY, UNSPECIFIED LOCATION: Primary | ICD-10-CM

## 2023-08-25 RX ORDER — AMOXICILLIN AND CLAVULANATE POTASSIUM 500; 125 MG/1; MG/1
1 TABLET, FILM COATED ORAL EVERY 12 HOURS SCHEDULED
Qty: 20 TABLET | Refills: 0 | Status: SHIPPED | OUTPATIENT
Start: 2023-08-25 | End: 2023-09-04

## 2023-08-25 NOTE — TELEPHONE ENCOUNTER
Can you send Augmentin for Chucho Junior?  As long as he's not allergic-500 mg bid for 10 days-if pcn allergic then zpack

## 2023-08-25 NOTE — TELEPHONE ENCOUNTER
Mirlande Adams called he was discharged from hospital on 8/22    Says he has developed a sinus infection and will like to now if you can prescribe him some antibiotics.     S/S: nose congestion with mucous and chest congestion, dizzy when he stands up    Says he has been taking Benadryl thinking it was allergy related which has helped, but not 100%    Charlene Lees on [facility] # [phone]

## 2023-09-01 ENCOUNTER — RA CDI HCC (OUTPATIENT)
Dept: OTHER | Facility: HOSPITAL | Age: 68
End: 2023-09-01

## 2023-09-12 ENCOUNTER — OFFICE VISIT (OUTPATIENT)
Dept: FAMILY MEDICINE CLINIC | Facility: CLINIC | Age: 68
End: 2023-09-12
Payer: MEDICARE

## 2023-09-12 VITALS
RESPIRATION RATE: 16 BRPM | HEART RATE: 72 BPM | OXYGEN SATURATION: 94 % | SYSTOLIC BLOOD PRESSURE: 120 MMHG | WEIGHT: 266 LBS | TEMPERATURE: 99.2 F | BODY MASS INDEX: 34.14 KG/M2 | DIASTOLIC BLOOD PRESSURE: 74 MMHG | HEIGHT: 74 IN

## 2023-09-12 DIAGNOSIS — G25.0 ESSENTIAL TREMOR: ICD-10-CM

## 2023-09-12 DIAGNOSIS — Z71.89 ACP (ADVANCE CARE PLANNING): ICD-10-CM

## 2023-09-12 DIAGNOSIS — E66.01 MORBID OBESITY (HCC): ICD-10-CM

## 2023-09-12 DIAGNOSIS — M50.30 DDD (DEGENERATIVE DISC DISEASE), CERVICAL: ICD-10-CM

## 2023-09-12 DIAGNOSIS — N40.1 BENIGN PROSTATIC HYPERPLASIA WITH LOWER URINARY TRACT SYMPTOMS, SYMPTOM DETAILS UNSPECIFIED: ICD-10-CM

## 2023-09-12 DIAGNOSIS — I73.9 PAD (PERIPHERAL ARTERY DISEASE) (HCC): ICD-10-CM

## 2023-09-12 DIAGNOSIS — Z23 NEED FOR VACCINATION: Primary | ICD-10-CM

## 2023-09-12 DIAGNOSIS — I51.7 LEFT VENTRICULAR HYPERTROPHY: ICD-10-CM

## 2023-09-12 DIAGNOSIS — I47.1 ATRIAL TACHYCARDIA: ICD-10-CM

## 2023-09-12 DIAGNOSIS — E23.6 PITUITARY MASS (HCC): ICD-10-CM

## 2023-09-12 DIAGNOSIS — Z86.79 HISTORY OF HEART BLOCK: ICD-10-CM

## 2023-09-12 DIAGNOSIS — M54.12 CERVICAL RADICULOPATHY: ICD-10-CM

## 2023-09-12 DIAGNOSIS — F32.0 MAJOR DEPRESSIVE DISORDER, SINGLE EPISODE, MILD (HCC): ICD-10-CM

## 2023-09-12 PROBLEM — R26.81 UNSTEADINESS: Status: ACTIVE | Noted: 2023-09-12

## 2023-09-12 PROCEDURE — 99214 OFFICE O/P EST MOD 30 MIN: CPT | Performed by: INTERNAL MEDICINE

## 2023-09-12 PROCEDURE — G0439 PPPS, SUBSEQ VISIT: HCPCS | Performed by: INTERNAL MEDICINE

## 2023-09-12 PROCEDURE — 99497 ADVNCD CARE PLAN 30 MIN: CPT | Performed by: INTERNAL MEDICINE

## 2023-09-12 PROCEDURE — 90662 IIV NO PRSV INCREASED AG IM: CPT | Performed by: INTERNAL MEDICINE

## 2023-09-12 PROCEDURE — G0008 ADMIN INFLUENZA VIRUS VAC: HCPCS | Performed by: INTERNAL MEDICINE

## 2023-09-12 RX ORDER — VERAPAMIL HYDROCHLORIDE 180 MG/1
CAPSULE, EXTENDED RELEASE ORAL
COMMUNITY
Start: 2023-08-22

## 2023-09-12 RX ORDER — SILODOSIN 8 MG/1
1 CAPSULE ORAL DAILY
Qty: 30 CAPSULE | Refills: 1 | Status: SHIPPED | OUTPATIENT
Start: 2023-09-12

## 2023-09-12 NOTE — ASSESSMENT & PLAN NOTE
Cedric's always had essential tremor but he says there are times when he feels overall shaky, and that he's in a "gyroscope"-not sure if he has something going on with his cerebellum, or what but he has calls out to ENT and Neurology

## 2023-09-12 NOTE — PROGRESS NOTES
Assessment and Plan:     Problem List Items Addressed This Visit        Cardiovascular and Mediastinum    Atrial tachycardia (HCC)     On verapamil         Relevant Medications    verapamil (VERELAN PM) 180 MG 24 hr capsule    Left ventricular hypertrophy     EF last echo was 45-50% and while hospitalized for heart block had a pacer placed         Relevant Medications    verapamil (VERELAN PM) 180 MG 24 hr capsule    PAD (peripheral artery disease) (HCC)       Nervous and Auditory    Essential tremor    Cervical radiculopathy       Musculoskeletal and Integument    DDD (degenerative disc disease), cervical       Genitourinary    BPH (benign prostatic hyperplasia)    Relevant Medications    Silodosin 8 MG CAPS       Other    Pituitary mass (HCC)    Morbid obesity (HCC)    Major depressive disorder, single episode, mild (HCC)    History of heart block   Other Visit Diagnoses     Need for vaccination    -  Primary    Relevant Orders    influenza vaccine, high-dose, PF 0.7 mL (FLUZONE HIGH-DOSE)           Preventive health issues were discussed with patient, and age appropriate screening tests were ordered as noted in patient's After Visit Summary. Personalized health advice and appropriate referrals for health education or preventive services given if needed, as noted in patient's After Visit Summary. History of Present Illness:     Patient presents for a Medicare Wellness Visit    Dea Sauer here for [address] [facility] [address], ACP discussion, and to discuss his chronic medical issues-was in the hospital in Aug for complete heart block and had a dual chamber pacemaker and aicd placed     Patient Care Team:  Bri Reyes MD as PCP - General (Internal Medicine)     Review of Systems:     Review of Systems   Constitutional: Negative. HENT: Negative. Respiratory: Negative. Cardiovascular: Negative. Musculoskeletal: Negative. Neurological: Positive for dizziness and tremors. Hematological: Negative. Psychiatric/Behavioral: Negative. Problem List:     Patient Active Problem List   Diagnosis   • Left bundle branch block   • Pituitary mass ([address])   • H/O spinal fusion   • Atrial tachycardia (HCC)   • Left ventricular hypertrophy   • Chronic back pain   • Neck pain   • Intestinal metaplasia of gastric cardia   • History of colonic polyps   • Asthma   • Fatty liver   • LVH (left ventricular hypertrophy)   • S/P laminectomy   • Splenomegaly   • Thrombocytopenia (HCC)   • Right shoulder pain   • Nocturia   • Essential tremor   • Erectile dysfunction   • DDD (degenerative disc disease), cervical   • Impingement syndrome of right shoulder   • Cervical radiculopathy   • BPH (benign prostatic hyperplasia)   • PAD (peripheral artery disease) (HCC)   • Dysuria   • Herpes zoster without complication   • Mild depression   • Morbid obesity ([address])   • Neuropathy   • Left knee pain   • Major depressive disorder, single episode, mild (HCC)   • History of heart block      Past Medical and Surgical History:     Past Medical History:   Diagnosis Date   • Alkaline phosphatase raised    • Allergic 1964   • Arthralgia of foot     arthralgia of the ankle and/or foot   • Arthritis    • Aspirin allergy    • Asthma    • Chronic back pain    • Cobalamin deficiency    • Coronary artery disease 2019    atrial tachycardia   • Displacement of cervical intervertebral disc without myelopathy    • Edema of lower extremity    • Gallstone    • GERD (gastroesophageal reflux disease)    • Heart murmur    • Hepatitis B    • History of diabetes mellitus    • History of echocardiogram 02/23/2017    Nondilated LV with well-preserved systolic function and estimated EF of 50%. Mild septal dyskinesis was noted with otherwise normal wall motion. Moderate to severe LVH was noted with evidence of grade I diastolic dysfunction. Aortic valve sclerosis and mitral annular calcifcation were present with no evidence of significant valvular stenosis.  Color Doppler examination as remarkable for trace MR, 1   • History of EKG 09/15/2017    Sinus rhythm with 1st degree AV block, Left bundle branch block, Abnormal ECG. EKG 5/25/17-Sinus rhythm with 1st degree AV block, Sinus rhythm with occasional premature ventricular complexes and fusion complexes. Cannot rule out Anteroseptal infarct, age undertermind. T wave abnormality, consider lateral ischemia abnormal ECG. EKG 3/24/17-Sinus rhythm with 1st degree AV block with occasional giorgio   • History of stress test 03/2017     Mildly abnormal study with evidence of a fixed inferoseptal defect. This is most likely related to attenuation artifact, however, the possibility of a nontransmural myocardial infarction involving these segments cannot be completely excluded. Quantitative analysis indicates equivocal ischemia in the mid segment of the septum. This is most likely related to artifacts. Gated wall motion analysis wa   • HL (hearing loss) 2020   • Hypertension    • Hypoglycemia    • Hypothyroidism    • Increased body mass index    • Induration penis plastica    • Infectious viral hepatitis 1998   • Mononeuritis    • Neuropathy    • Non-alcoholic fatty liver disease    • Obesity    • Osteopenia    • Osteoporosis    • Otitis media 09/2020   • Palpitations    • Pituitary adenoma ([address])    • Pneumonia    • Scoliosis 1972   • Sebaceous cyst     infection of sebaceous cyst   • Testicular hypofunction    • Thrombocytopenic disorder (HCC)    • Tremor    • Urinary tract infection    • Visual impairment    • Vitamin D deficiency      Past Surgical History:   Procedure Laterality Date   • BACK SURGERY  2010 2010- Fusion L4-L5-S1   • CHOLECYSTECTOMY  ?    • COLOGUARD (HISTORICAL)  04/01/2019    Negative   • CYST REMOVAL  1987 1987- Sebaceous cyst removed from back   • EGD  07/15/2019   • EGD  04/03/2018   • EYE SURGERY  07/13/2022   • HERNIA REPAIR  1957   • LAMINECTOMY      Laminectomy with internal fixation L4-S1   • LIPOMA RESECTION Right 2014    abdomen   • LUMBAR FUSION      L4-L5-S1   • MOLE REMOVAL      back and face   • SPINE SURGERY  08/10/2010--2020   • TOENAIL EXCISION Left    • VASECTOMY        Family History:     Family History   Problem Relation Age of Onset   • Atrial fibrillation Father    • Dementia Father    • Heart disease Father         pacemaker   • Stroke Father    • Coronary artery disease Father    • Aortic stenosis Father    • Other Father         muscle atrophy   • Alcohol abuse Father    • Arthritis Mother    • Vision loss Mother    • Arthritis Maternal Grandmother    • Hearing loss Paternal Grandfather       Social History:     Social History     Socioeconomic History   • Marital status:      Spouse name: None   • Number of children: None   • Years of education: None   • Highest education level: None   Occupational History   • Occupation: Retired - Transportation   Tobacco Use   • Smoking status: Never   • Smokeless tobacco: Never   Vaping Use   • Vaping Use: Never used   Substance and Sexual Activity   • Alcohol use: Not Currently   • Drug use: Never     Comment: No use   • Sexual activity: Not Currently     Partners: Male     Birth control/protection: Abstinence   Other Topics Concern   • None   Social History Narrative    · Most recent tobacco use screenin2019      · Do you currently or have you served in the [address] [place] [address] [employer]:   No      · Were you activated, into active duty, as a member of the [employer] or as a Reservist:   No      · Marital status:   Single  ()     · Exercise level:   None      · Diet:   Regular      · General stress level:   Medium      · Alcohol intake:   Occasional  1-2 a month     · Caffeine intake: Moderate  1-2 cups a day     · Guns present in home:   No      · Seat belts used routinely:   Yes      · Sunscreen used routinely:   Yes      · Advance directive:    Yes      · Live alone or with others:   with others  Roommate      · Smoke alarm in home: Yes      · Are there stairs in your home: Yes      · Pets:   Yes  - Cats          Social Determinants of Health     Financial Resource Strain: Low Risk  (9/6/2023)    Overall Financial Resource Strain (CARDIA)    • Difficulty of Paying Living Expenses: Not very hard   Food Insecurity: Not on file   Transportation Needs: No Transportation Needs (9/6/2023)    PRAPARE - Transportation    • Lack of Transportation (Medical): No    • Lack of Transportation (Non-Medical): No   Physical Activity: Not on file   Stress: Not on file   Social Connections: Not on file   Intimate Partner Violence: Not on file   Housing Stability: Not on file      Medications and Allergies:     Current Outpatient Medications   Medication Sig Dispense Refill   • acetaminophen (TYLENOL) 650 mg CR tablet Take 650 mg by mouth every 8 (eight) hours as needed     • b complex vitamins capsule Take 1 capsule by mouth daily     • Biotin 28501 MCG TABS      • Calcium Carb-Cholecalciferol (CALCIUM/VITAMIN D PO)      • Cholecalciferol (Vitamin D3) 50 MCG (2000 UT) TABS Take 2,000 Units by mouth daily     • finasteride (PROSCAR) 5 mg tablet      • lidocaine (LMX) 4 % cream Apply topically 2 (two) times a day 30 g 1   • naproxen (Naprosyn) 500 mg tablet Take 1 tablet (500 mg total) by mouth daily at bedtime as needed (leg pain) 30 tablet 3   • omeprazole (PriLOSEC) 20 mg delayed release capsule Take 20 mg by mouth daily     • Polyethyl Glycol-Propyl Glycol (SYSTANE OP)      • primidone (MYSOLINE) 50 mg tablet Take 150 mg by mouth 2 (two) times a day     • Silodosin 8 MG CAPS Take 1 capsule by mouth in the morning 1 po daily 30 capsule 1   • verapamil (VERELAN PM) 180 MG 24 hr capsule        No current facility-administered medications for this visit.      Allergies   Allergen Reactions   • Carvedilol Shortness Of Breath   • Metoprolol Shortness Of Breath and Fatigue   • Aspirin GI Intolerance   • Ciprofloxacin Other (See Comments) irregular heart rate   • Clindamycin Other (See Comments)     diarrhea   • Duloxetine Hcl GI Intolerance and Other (See Comments)   • Propranolol Other (See Comments)     Dizzy, lathargic   • Pollen Extract Other (See Comments)     Itchy eyes, runny nose, scratchy throat      Immunizations:     Immunization History   Administered Date(s) Administered   • COVID-19 PFIZER VACCINE 0.3 ML IM 03/22/2021, 04/14/2021, 10/15/2021, 04/23/2022   • INFLUENZA 09/01/2016, 09/29/2016, 10/02/2017, 09/19/2018, 09/07/2020, 10/01/2021   • Influenza, high dose seasonal 0.7 mL 10/06/2022   • Influenza, injectable, quadrivalent, preservative free 0.5 mL 10/03/2018   • Pneumococcal Conjugate 13-Valent 03/22/2019   • Pneumococcal Polysaccharide PPV23 10/20/2014, 01/11/2023   • Tdap 05/21/2017   • Zoster Vaccine Recombinant 08/09/2021, 12/06/2021   • influenza, trivalent, adjuvanted 09/07/2019      Health Maintenance:         Topic Date Due   • Colorectal Cancer Screening  01/18/2026   • Hepatitis C Screening  Completed         Topic Date Due   • Hepatitis A Vaccine (1 of 2 - Risk 2-dose series) Never done   • COVID-19 Vaccine (5 - Pfizer series) 06/18/2022   • Influenza Vaccine (1) 09/01/2023      Medicare Screening Tests and Risk Assessments:     Ryanne Howell is here for his Subsequent Wellness visit. Health Risk Assessment:   Patient rates overall health as fair. Patient feels that their physical health rating is much worse. Patient is satisfied with their life. Eyesight was rated as slightly worse. Hearing was rated as slightly worse. Patient feels that their emotional and mental health rating is slightly better. Patients states they are never, rarely angry. Patient states they are often unusually tired/fatigued. Pain experienced in the last 7 days has been a lot. Patient's pain rating has been 7/10. Patient states that he has experienced weight loss or gain in last 6 months.      Depression Screening:   PHQ-9 Score: 0      Fall Risk Screening: In the past year, patient has experienced: no history of falling in past year      Home Safety:  Patient does not have trouble with stairs inside or outside of their home. Patient has working smoke alarms and has working carbon monoxide detector. Home safety hazards include: none. Nutrition:   Current diet is Limited junk food and Regular. Medications:   Patient is currently taking over-the-counter supplements. OTC medications include: EB-N6 multiple b vitamin supplements for neuropathy. Patient is able to manage medications. also taking Allerfin for allergies and sinus pressure    Activities of Daily Living (ADLs)/Instrumental Activities of Daily Living (IADLs):   Walk and transfer into and out of bed and chair?: Yes  Dress and groom yourself?: Yes    Bathe or shower yourself?: Yes    Feed yourself? Yes  Do your laundry/housekeeping?: Yes  Manage your money, pay your bills and track your expenses?: Yes  Make your own meals?: Yes    Do your own shopping?: Yes    Previous Hospitalizations:   Any hospitalizations or ED visits within the last 12 months?: No      Advance Care Planning:   Living will: Yes    Durable POA for healthcare:  Yes    Advanced directive: Yes    Advanced directive counseling given: Yes    Five wishes given: No    End of Life Decisions reviewed with patient: Yes      Cognitive Screening:   Provider or family/friend/caregiver concerned regarding cognition?: No    PREVENTIVE SCREENINGS      Cardiovascular Screening:    General: Screening Current      Diabetes Screening:     General: Screening Current      Colorectal Cancer Screening:     General: Screening Current      Prostate Cancer Screening:    General: Screening Current      Osteoporosis Screening:    General: Screening Not Indicated      Abdominal Aortic Aneurysm (AAA) Screening:    Risk factors include: age between 70-77 yo        General: Screening Not Indicated      Lung Cancer Screening:     General: Screening Not Indicated      Hepatitis C Screening:    General: Screening Current    Screening, Brief Intervention, and Referral to Treatment (SBIRT)    Screening  Typical number of drinks in a day: 0  Typical number of drinks in a week: 0  Interpretation: Low risk drinking behavior. AUDIT-C Screenin) How often did you have a drink containing alcohol in the past year? monthly or less  2) How many drinks did you have on a typical day when you were drinking in the past year? 0  3) How often did you have 6 or more drinks on one occasion in the past year? never    AUDIT-C Score: 1  Interpretation: Score 0-3 (male): Negative screen for alcohol misuse    Single Item Drug Screening:  How often have you used an illegal drug (including marijuana) or a prescription medication for non-medical reasons in the past year? never    Single Item Drug Screen Score: 0  Interpretation: Negative screen for possible drug use disorder    No results found. Physical Exam:     /74 (BP Location: Left arm, Patient Position: Sitting, Cuff Size: Large)   Pulse 72   Temp 99.2 °F (37.3 °C) (Tympanic)   Resp 16   Ht 6' 2" (1.88 m)   Wt 121 kg (266 lb)   SpO2 94%   BMI 34.15 kg/m²     Physical Exam  Constitutional:       Appearance: He is obese. HENT:      Head: Normocephalic and atraumatic. Right Ear: External ear normal.      Left Ear: External ear normal.      Nose: Nose normal.      Mouth/Throat:      Mouth: Mucous membranes are moist.   Eyes:      Pupils: Pupils are equal, round, and reactive to light. Cardiovascular:      Rate and Rhythm: Normal rate and regular rhythm. Heart sounds: Normal heart sounds. Pulmonary:      Effort: Pulmonary effort is normal.      Breath sounds: Normal breath sounds. Musculoskeletal:         General: Normal range of motion. Cervical back: Normal range of motion. Skin:     General: Skin is warm. Capillary Refill: Capillary refill takes less than 2 seconds.    Neurological: General: No focal deficit present. Mental Status: He is alert and oriented to person, place, and time. Mental status is at baseline.    Psychiatric:         Mood and Affect: Mood normal.          Cathy Kwok MD

## 2023-09-12 NOTE — PATIENT INSTRUCTIONS
Medicare Preventive Visit Patient Instructions  Thank you for completing your Welcome to Medicare Visit or Medicare Annual Wellness Visit today. Your next wellness visit will be due in one year (9/12/2024). The screening/preventive services that you may require over the next 5-10 years are detailed below. Some tests may not apply to you based off risk factors and/or age. Screening tests ordered at today's visit but not completed yet may show as past due. Also, please note that scanned in results may not display below. Preventive Screenings:  Service Recommendations Previous Testing/Comments   Colorectal Cancer Screening  · Colonoscopy    · Fecal Occult Blood Test (FOBT)/Fecal Immunochemical Test (FIT)  · Fecal DNA/Cologuard Test  · Flexible Sigmoidoscopy Age: 43-73 years old   Colonoscopy: every 10 years (May be performed more frequently if at higher risk)  OR  FOBT/FIT: every 1 year  OR  Cologuard: every 3 years  OR  Sigmoidoscopy: every 5 years  Screening may be recommended earlier than age 39 if at higher risk for colorectal cancer. Also, an individualized decision between you and your healthcare provider will decide whether screening between the ages of 77-80 would be appropriate.  Colonoscopy: 01/18/2023  FOBT/FIT: Not on file  Cologuard: 01/18/2023  Sigmoidoscopy: Not on file    Screening Current     Prostate Cancer Screening Individualized decision between patient and health care provider in men between ages of 53-66   Medicare will cover every 12 months beginning on the day after your 50th birthday PSA: 2.0 ng/mL     Screening Current     Hepatitis C Screening Once for adults born between 1945 and 1965  More frequently in patients at high risk for Hepatitis C Hep C Antibody: 03/16/2018    Screening Current   Diabetes Screening 1-2 times per year if you're at risk for diabetes or have pre-diabetes Fasting glucose: No results in last 5 years (No results in last 5 years)  A1C: No results in last 5 years (No results in last 5 years)  Screening Current   Cholesterol Screening Once every 5 years if you don't have a lipid disorder. May order more often based on risk factors. Lipid panel: 03/25/2019  Screening Current      Other Preventive Screenings Covered by Medicare:  1. Abdominal Aortic Aneurysm (AAA) Screening: covered once if your at risk. You're considered to be at risk if you have a family history of AAA or a male between the age of 70-76 who smoking at least 100 cigarettes in your lifetime. 2. Lung Cancer Screening: covers low dose CT scan once per year if you meet all of the following conditions: (1) Age 48-67; (2) No signs or symptoms of lung cancer; (3) Current smoker or have quit smoking within the last 15 years; (4) You have a tobacco smoking history of at least 20 pack years (packs per day x number of years you smoked); (5) You get a written order from a healthcare provider. 3. Glaucoma Screening: covered annually if you're considered high risk: (1) You have diabetes OR (2) Family history of glaucoma OR (3)  aged 48 and older OR (3)  American aged 72 and older  3. Osteoporosis Screening: covered every 2 years if you meet one of the following conditions: (1) Have a vertebral abnormality; (2) On glucocorticoid therapy for more than 3 months; (3) Have primary hyperparathyroidism; (4) On osteoporosis medications and need to assess response to drug therapy. 5. HIV Screening: covered annually if you're between the age of 14-79. Also covered annually if you are younger than 13 and older than 72 with risk factors for HIV infection. For pregnant patients, it is covered up to 3 times per pregnancy.     Immunizations:  Immunization Recommendations   Influenza Vaccine Annual influenza vaccination during flu season is recommended for all persons aged >= 6 months who do not have contraindications   Pneumococcal Vaccine   * Pneumococcal conjugate vaccine = PCV13 (Prevnar 13), PCV15 (Vaxneuvance), PCV20 (Prevnar 20)  * Pneumococcal polysaccharide vaccine = PPSV23 (Pneumovax) Adults 2364 years old: 1-3 doses may be recommended based on certain risk factors  Adults 72 years old: 1-2 doses may be recommended based off what pneumonia vaccine you previously received   Hepatitis B Vaccine 3 dose series if at intermediate or high risk (ex: diabetes, end stage renal disease, liver disease)   Tetanus (Td) Vaccine - COST NOT COVERED BY MEDICARE PART B Following completion of primary series, a booster dose should be given every 10 years to maintain immunity against tetanus. Td may also be given as tetanus wound prophylaxis. Tdap Vaccine - COST NOT COVERED BY MEDICARE PART B Recommended at least once for all adults. For pregnant patients, recommended with each pregnancy. Shingles Vaccine (Shingrix) - COST NOT COVERED BY MEDICARE PART B  2 shot series recommended in those aged 48 and above     Health Maintenance Due:      Topic Date Due   • Colorectal Cancer Screening  01/18/2026   • Hepatitis C Screening  Completed     Immunizations Due:      Topic Date Due   • Hepatitis A Vaccine (1 of 2 - Risk 2-dose series) Never done   • COVID-19 Vaccine (5 - Pfizer series) 06/18/2022   • Influenza Vaccine (1) 09/01/2023     Advance Directives   What are advance directives? Advance directives are legal documents that state your wishes and plans for medical care. These plans are made ahead of time in case you lose your ability to make decisions for yourself. Advance directives can apply to any medical decision, such as the treatments you want, and if you want to donate organs. What are the types of advance directives? There are many types of advance directives, and each state has rules about how to use them. You may choose a combination of any of the following:  · Living will: This is a written record of the treatment you want.  You can also choose which treatments you do not want, which to limit, and which to stop at a certain time. This includes surgery, medicine, IV fluid, and tube feedings. · Durable power of  for healthcare [facility]): This is a written record that states who you want to make healthcare choices for you when you are unable to make them for yourself. This person, called a proxy, is usually a family member or a friend. You may choose more than 1 proxy. · Do not resuscitate (DNR) order:  A DNR order is used in case your heart stops beating or you stop breathing. It is a request not to have certain forms of treatment, such as CPR. A DNR order may be included in other types of advance directives. · Medical directive: This covers the care that you want if you are in a coma, near death, or unable to make decisions for yourself. You can list the treatments you want for each condition. Treatment may include pain medicine, surgery, blood transfusions, dialysis, IV or tube feedings, and a ventilator (breathing machine). · Values history: This document has questions about your views, beliefs, and how you feel and think about life. This information can help others choose the care that you would choose. Why are advance directives important? An advance directive helps you control your care. Although spoken wishes may be used, it is better to have your wishes written down. Spoken wishes can be misunderstood, or not followed. Treatments may be given even if you do not want them. An advance directive may make it easier for your family to make difficult choices about your care. Weight Management   Why it is important to manage your weight:  Being overweight increases your risk of health conditions such as heart disease, high blood pressure, type 2 diabetes, and certain types of cancer. It can also increase your risk for osteoarthritis, sleep apnea, and other respiratory problems. Aim for a slow, steady weight loss. Even a small amount of weight loss can lower your risk of health problems.   How to lose weight safely:  A safe and healthy way to lose weight is to eat fewer calories and get regular exercise. You can lose up about 1 pound a week by decreasing the number of calories you eat by 500 calories each day. Healthy meal plan for weight management:  A healthy meal plan includes a variety of foods, contains fewer calories, and helps you stay healthy. A healthy meal plan includes the following:  · Eat whole-grain foods more often. A healthy meal plan should contain fiber. Fiber is the part of grains, fruits, and vegetables that is not broken down by your body. Whole-grain foods are healthy and provide extra fiber in your diet. Some examples of whole-grain foods are whole-wheat breads and pastas, oatmeal, brown rice, and bulgur. · Eat a variety of vegetables every day. Include dark, leafy greens such as spinach, kale, johny greens, and mustard greens. Eat yellow and orange vegetables such as carrots, sweet potatoes, and winter squash. · Eat a variety of fruits every day. Choose fresh or canned fruit (canned in its own juice or light syrup) instead of juice. Fruit juice has very little or no fiber. · Eat low-fat dairy foods. Drink fat-free (skim) milk or 1% milk. Eat fat-free yogurt and low-fat cottage cheese. Try low-fat cheeses such as mozzarella and other reduced-fat cheeses. · Choose meat and other protein foods that are low in fat. Choose beans or other legumes such as split peas or lentils. Choose fish, skinless poultry (chicken or turkey), or lean cuts of red meat (beef or pork). Before you cook meat or poultry, cut off any visible fat. · Use less fat and oil. Try baking foods instead of frying them. Add less fat, such as margarine, sour cream, regular salad dressing and mayonnaise to foods. Eat fewer high-fat foods. Some examples of high-fat foods include french fries, doughnuts, ice cream, and cakes. · Eat fewer sweets. Limit foods and drinks that are high in sugar.  This includes candy, cookies, regular soda, and sweetened drinks. Exercise:  Exercise at least 30 minutes per day on most days of the week. Some examples of exercise include walking, biking, dancing, and swimming. You can also fit in more physical activity by taking the stairs instead of the elevator or parking farther away from stores. Ask your healthcare provider about the best exercise plan for you. © Copyright [address] 2018 Information is for End User's use only and may not be sold, redistributed or otherwise used for commercial purposes.  All illustrations and images included in CareNotes® are the copyrighted property of [employer] or  [employer]

## 2023-12-18 DIAGNOSIS — M79.604 PAIN IN BOTH LOWER EXTREMITIES: ICD-10-CM

## 2023-12-18 DIAGNOSIS — M79.605 PAIN IN BOTH LOWER EXTREMITIES: ICD-10-CM

## 2023-12-18 RX ORDER — NAPROXEN 500 MG/1
TABLET ORAL
Qty: 30 TABLET | Refills: 0 | Status: SHIPPED | OUTPATIENT
Start: 2023-12-18

## 2024-01-24 ENCOUNTER — OFFICE VISIT (OUTPATIENT)
Dept: FAMILY MEDICINE CLINIC | Facility: CLINIC | Age: 69
End: 2024-01-24
Payer: MEDICARE

## 2024-01-24 VITALS
HEIGHT: 74 IN | SYSTOLIC BLOOD PRESSURE: 120 MMHG | BODY MASS INDEX: 35.04 KG/M2 | TEMPERATURE: 98.2 F | HEART RATE: 64 BPM | DIASTOLIC BLOOD PRESSURE: 84 MMHG | OXYGEN SATURATION: 95 % | RESPIRATION RATE: 16 BRPM | WEIGHT: 273 LBS

## 2024-01-24 DIAGNOSIS — U07.1 COVID: ICD-10-CM

## 2024-01-24 DIAGNOSIS — J32.9 SINUSITIS, UNSPECIFIED CHRONICITY, UNSPECIFIED LOCATION: Primary | ICD-10-CM

## 2024-01-24 PROCEDURE — 99214 OFFICE O/P EST MOD 30 MIN: CPT | Performed by: INTERNAL MEDICINE

## 2024-01-24 RX ORDER — NIRMATRELVIR AND RITONAVIR 300-100 MG
3 KIT ORAL 2 TIMES DAILY
Qty: 30 TABLET | Refills: 0 | Status: SHIPPED | OUTPATIENT
Start: 2024-01-24 | End: 2024-01-29

## 2024-01-24 RX ORDER — AMOXICILLIN AND CLAVULANATE POTASSIUM 500; 125 MG/1; MG/1
1 TABLET, FILM COATED ORAL EVERY 8 HOURS SCHEDULED
Qty: 30 TABLET | Refills: 0 | Status: SHIPPED | OUTPATIENT
Start: 2024-01-24 | End: 2024-01-24 | Stop reason: SDUPTHER

## 2024-01-24 RX ORDER — PSEUDOEPHEDRINE HYDROCHLORIDE 60 MG/1
60 TABLET, FILM COATED ORAL EVERY 8 HOURS PRN
Qty: 60 TABLET | Refills: 0 | Status: SHIPPED | OUTPATIENT
Start: 2024-01-24

## 2024-01-24 RX ORDER — AMOXICILLIN AND CLAVULANATE POTASSIUM 500; 125 MG/1; MG/1
1 TABLET, FILM COATED ORAL EVERY 8 HOURS SCHEDULED
Qty: 30 TABLET | Refills: 0 | Status: SHIPPED | OUTPATIENT
Start: 2024-01-24 | End: 2024-02-03

## 2024-01-24 NOTE — PROGRESS NOTES
Assessment/Plan: Covid 19 infection, also has a probable sinus infection on top of it-will send paxlovid and cover for potential bacterial infection with augmentin and sudafed         Problem List Items Addressed This Visit    None  Visit Diagnoses       Sinusitis, unspecified chronicity, unspecified location    -  Primary    Relevant Medications    amoxicillin-clavulanate (AUGMENTIN) 500-125 mg per tablet    pseudoephedrine (SUDAFED) 60 mg tablet    COVID        Relevant Medications    nirmatrelvir & ritonavir (Paxlovid, 300/100,) tablet therapy pack    nirmatrelvir & ritonavir (Paxlovid, 300/100,) tablet therapy pack              Subjective:      Patient ID: Cedric Kim is a 68 y.o. male.    Cedric here with several days of bad sinus/nasal congestion, no fever, very fatigued, slight cough-tests positive for covid here-he is vaccinated and boosted-Cedric has a hx of sinus infections    The following portions of the patient's history were reviewed and updated as appropriate:   Past Medical History:  He has a past medical history of Alkaline phosphatase raised, Allergic (1964), Arthralgia of foot, Arthritis, Aspirin allergy, Asthma, Chronic back pain, Cobalamin deficiency, Coronary artery disease (2019), Displacement of cervical intervertebral disc without myelopathy, Edema of lower extremity, Gallstone, GERD (gastroesophageal reflux disease), Heart murmur, Hepatitis B, History of diabetes mellitus, History of echocardiogram (02/23/2017), History of EKG (09/15/2017), History of stress test (03/2017), HL (hearing loss) (2020), Hypertension, Hypoglycemia, Hypothyroidism, Increased body mass index, Induration penis plastica, Infectious viral hepatitis (1998), Mononeuritis, Neuropathy, Non-alcoholic fatty liver disease, Obesity, Osteopenia, Osteoporosis, Otitis media (09/2020), Palpitations, Pituitary adenoma (HCC), Pneumonia, Scoliosis (1972), Sebaceous cyst, Testicular hypofunction, Thrombocytopenic disorder (HCC), Tremor,  Urinary tract infection, Visual impairment, and Vitamin D deficiency.,  _______________________________________________________________________  Medical Problems:  does not have any pertinent problems on file.,  _______________________________________________________________________  Past Surgical History:   has a past surgical history that includes Lipoma resection (Right, 2014); Lumbar fusion (2010); Toenail excision (Left, 1993); Laminectomy; Mole removal; Hernia repair (1957); EGD (07/15/2019); EGD (04/03/2018); Back surgery (2010); Cyst Removal (1987); Cologuard (Historical) (04/01/2019); Cholecystectomy (?); Eye surgery (07/13/2022); Spine surgery (08/10/2010--02/26/2020); and Vasectomy (1976).,  _______________________________________________________________________  Family History:  family history includes Alcohol abuse in his father; Aortic stenosis in his father; Arthritis in his maternal grandmother and mother; Atrial fibrillation in his father; Coronary artery disease in his father; Dementia in his father; Hearing loss in his paternal grandfather; Heart disease in his father; Other in his father; Stroke in his father; Vision loss in his mother.,  _______________________________________________________________________  Social History:   reports that he has never smoked. He has never used smokeless tobacco. He reports that he does not currently use alcohol. He reports that he does not use drugs.,  _______________________________________________________________________  Allergies:  is allergic to carvedilol, metoprolol, aspirin, ciprofloxacin, clindamycin, duloxetine hcl, propranolol, and pollen extract..  _______________________________________________________________________  Current Outpatient Medications   Medication Sig Dispense Refill    acetaminophen (TYLENOL) 650 mg CR tablet Take 650 mg by mouth every 8 (eight) hours as needed      amoxicillin-clavulanate (AUGMENTIN) 500-125 mg per tablet Take 1  tablet by mouth every 8 (eight) hours for 10 days 30 tablet 0    b complex vitamins capsule Take 1 capsule by mouth daily      Biotin 53587 MCG TABS       Calcium Carb-Cholecalciferol (CALCIUM/VITAMIN D PO)       Cholecalciferol (Vitamin D3) 50 MCG (2000 UT) TABS Take 2,000 Units by mouth daily      finasteride (PROSCAR) 5 mg tablet       lidocaine (LMX) 4 % cream Apply topically 2 (two) times a day 30 g 1    naproxen (NAPROSYN) 500 mg tablet TAKE ONE TABLET BY MOUTH DAILY AT BEDTIME as needed for leg pain 30 tablet 0    nirmatrelvir & ritonavir (Paxlovid, 300/100,) tablet therapy pack Take 3 tablets by mouth 2 (two) times a day for 5 days Take 2 nirmatrelvir tablets + 1 ritonavir tablet together per dose 30 tablet 0    nirmatrelvir & ritonavir (Paxlovid, 300/100,) tablet therapy pack Take 3 tablets by mouth 2 (two) times a day for 5 days Take 2 nirmatrelvir tablets + 1 ritonavir tablet together per dose 30 tablet 0    omeprazole (PriLOSEC) 20 mg delayed release capsule Take 20 mg by mouth daily      Polyethyl Glycol-Propyl Glycol (SYSTANE OP)       primidone (MYSOLINE) 50 mg tablet Take 150 mg by mouth 2 (two) times a day      pseudoephedrine (SUDAFED) 60 mg tablet Take 1 tablet (60 mg total) by mouth every 8 (eight) hours as needed for congestion 60 tablet 0    Silodosin 8 MG CAPS Take 1 capsule by mouth in the morning 1 po daily 30 capsule 1    verapamil (VERELAN PM) 180 MG 24 hr capsule        No current facility-administered medications for this visit.     _______________________________________________________________________  Review of Systems   Constitutional:  Positive for fatigue. Negative for fever.   HENT:  Positive for congestion, rhinorrhea, sinus pressure and sinus pain.    Respiratory:  Positive for cough.        Objective:  Vitals:    01/24/24 0928   BP: 120/84   BP Location: Left arm   Patient Position: Sitting   Cuff Size: Large   Pulse: 64   Resp: 16   Temp: 98.2 °F (36.8 °C)   TempSrc: Tympanic  "  SpO2: 95%   Weight: 124 kg (273 lb)   Height: 6' 2\" (1.88 m)     Body mass index is 35.05 kg/m².     Physical Exam  Constitutional:       Appearance: He is obese.   HENT:      Head: Normocephalic and atraumatic.      Right Ear: External ear normal.      Left Ear: External ear normal.      Ears:      Comments: Sinus pain     Nose: Nose normal.      Mouth/Throat:      Mouth: Mucous membranes are moist.   Cardiovascular:      Rate and Rhythm: Normal rate and regular rhythm.   Pulmonary:      Effort: Pulmonary effort is normal.      Breath sounds: Normal breath sounds.   Musculoskeletal:      Cervical back: Normal range of motion.   Skin:     General: Skin is warm.   Neurological:      General: No focal deficit present.      Mental Status: He is alert.   Psychiatric:         Mood and Affect: Mood normal.       "

## 2024-03-30 DIAGNOSIS — M79.605 PAIN IN BOTH LOWER EXTREMITIES: ICD-10-CM

## 2024-03-30 DIAGNOSIS — M79.604 PAIN IN BOTH LOWER EXTREMITIES: ICD-10-CM

## 2024-03-30 RX ORDER — NAPROXEN 500 MG/1
TABLET ORAL
Qty: 30 TABLET | Refills: 0 | Status: SHIPPED | OUTPATIENT
Start: 2024-03-30

## 2024-08-18 ENCOUNTER — PATIENT MESSAGE (OUTPATIENT)
Dept: FAMILY MEDICINE CLINIC | Facility: CLINIC | Age: 69
End: 2024-08-18

## 2024-08-20 NOTE — PATIENT COMMUNICATION
Patient called , asking to verify Dr Abdifatah Sanchez, was unable to find contact information. Reviewed  network find a Doc. If this is JENIFER Butler , [facility] in [place] , please call patient back with contact information . Thank you

## 2024-09-06 ENCOUNTER — RA CDI HCC (OUTPATIENT)
Dept: OTHER | Facility: HOSPITAL | Age: 69
End: 2024-09-06

## 2024-09-16 ENCOUNTER — OFFICE VISIT (OUTPATIENT)
Dept: FAMILY MEDICINE CLINIC | Facility: CLINIC | Age: 69
End: 2024-09-16
Payer: MEDICARE

## 2024-09-16 VITALS
HEART RATE: 69 BPM | DIASTOLIC BLOOD PRESSURE: 84 MMHG | HEIGHT: 74 IN | WEIGHT: 277 LBS | SYSTOLIC BLOOD PRESSURE: 122 MMHG | OXYGEN SATURATION: 95 % | BODY MASS INDEX: 35.55 KG/M2

## 2024-09-16 DIAGNOSIS — E23.6 PITUITARY MASS (HCC): ICD-10-CM

## 2024-09-16 DIAGNOSIS — J96.01 ACUTE RESPIRATORY FAILURE WITH HYPOXIA (HCC): ICD-10-CM

## 2024-09-16 DIAGNOSIS — E66.9 CLASS 2 OBESITY WITHOUT SERIOUS COMORBIDITY IN ADULT, UNSPECIFIED BMI, UNSPECIFIED OBESITY TYPE: ICD-10-CM

## 2024-09-16 DIAGNOSIS — Z23 ENCOUNTER FOR IMMUNIZATION: ICD-10-CM

## 2024-09-16 DIAGNOSIS — I73.9 PAD (PERIPHERAL ARTERY DISEASE) (HCC): ICD-10-CM

## 2024-09-16 DIAGNOSIS — Z00.00 MEDICARE ANNUAL WELLNESS VISIT, SUBSEQUENT: Primary | ICD-10-CM

## 2024-09-16 DIAGNOSIS — Z98.1 H/O SPINAL FUSION: ICD-10-CM

## 2024-09-16 DIAGNOSIS — Z12.11 COLON CANCER SCREENING: ICD-10-CM

## 2024-09-16 DIAGNOSIS — G25.0 ESSENTIAL TREMOR: ICD-10-CM

## 2024-09-16 DIAGNOSIS — I47.19 ATRIAL TACHYCARDIA (HCC): ICD-10-CM

## 2024-09-16 DIAGNOSIS — R63.5 ABNORMAL WEIGHT GAIN: ICD-10-CM

## 2024-09-16 DIAGNOSIS — I44.2 COMPLETE HEART BLOCK (HCC): ICD-10-CM

## 2024-09-16 DIAGNOSIS — D69.6 THROMBOCYTOPENIA (HCC): ICD-10-CM

## 2024-09-16 DIAGNOSIS — I44.7 LEFT BUNDLE BRANCH BLOCK: ICD-10-CM

## 2024-09-16 DIAGNOSIS — F32.0 MAJOR DEPRESSIVE DISORDER, SINGLE EPISODE, MILD (HCC): ICD-10-CM

## 2024-09-16 DIAGNOSIS — G62.9 NEUROPATHY: ICD-10-CM

## 2024-09-16 DIAGNOSIS — E66.01 MORBID OBESITY (HCC): ICD-10-CM

## 2024-09-16 DIAGNOSIS — M50.30 DDD (DEGENERATIVE DISC DISEASE), CERVICAL: ICD-10-CM

## 2024-09-16 DIAGNOSIS — I51.7 LVH (LEFT VENTRICULAR HYPERTROPHY): ICD-10-CM

## 2024-09-16 DIAGNOSIS — K76.0 FATTY LIVER: ICD-10-CM

## 2024-09-16 PROCEDURE — G0439 PPPS, SUBSEQ VISIT: HCPCS | Performed by: INTERNAL MEDICINE

## 2024-09-16 PROCEDURE — 99214 OFFICE O/P EST MOD 30 MIN: CPT | Performed by: INTERNAL MEDICINE

## 2024-09-16 NOTE — ASSESSMENT & PLAN NOTE
Would like to walk more and lose weight, and recommended heart healthy low fat low cholesterol diet  Orders:    CBC and differential; Future    Comprehensive metabolic panel; Future

## 2024-09-16 NOTE — PROGRESS NOTES
Ambulatory Visit  Name: Cedric Kim      : 1955      MRN: [id number]  Encounter Provider: Darlene Washington MD  Encounter Date: 2024   Encounter department: [facility] MEDICINE    Assessment & Plan  Encounter for immunization         Left bundle branch block         LVH (left ventricular hypertrophy)         PAD (peripheral artery disease) (HCC)         Fatty liver  Would like to walk more and lose weight, and recommended heart healthy low fat low cholesterol diet  Orders:    CBC and differential; Future    Comprehensive metabolic panel; Future    Essential tremor         Neuropathy         H/O spinal fusion         Pituitary mass (HCC)         Medicare annual wellness visit, subsequent         DDD (degenerative disc disease), cervical         Class 2 obesity without serious comorbidity in adult, unspecified BMI, unspecified obesity type    Orders:    Lipid panel; Future    TSH, 3rd generation; Future    Acute respiratory failure with hypoxia (HCC)         Complete heart block (HCC)         Major depressive disorder, single episode, mild (HCC)           Atrial tachycardia         Thrombocytopenia (HCC)         Morbid obesity (HCC)         Abnormal weight gain    Orders:    TSH, 3rd generation; Future    Colon cancer screening       Cedric will be getting a colonoscopy in the near future     Preventive health issues were discussed with patient, and age appropriate screening tests were ordered as noted in patient's After Visit Summary. Personalized health advice and appropriate referrals for health education or preventive services given if needed, as noted in patient's After Visit Summary.    History of Present Illness     Cedric here for his MAWV, and to touch base on his hx of DDD, chronic osteoarthritic pain, LVH and obesity doing well, got  back in April-is supposed to go for a spinal cord stimulator at some point in the near future       Patient Care Team:  Darlene Washington MD  as PCP - General (Internal Medicine)    Review of Systems   Constitutional: Negative.    HENT: Negative.     Respiratory: Negative.     Gastrointestinal: Negative.    Musculoskeletal:  Positive for arthralgias, back pain and gait problem.   Neurological:  Positive for numbness.   Hematological: Negative.      Medical History Reviewed by provider this encounter:       Annual Wellness Visit Questionnaire   Cedric is here for his Subsequent Wellness visit.     Health Risk Assessment:   Patient rates overall health as fair. Patient feels that their physical health rating is slightly worse. Patient is satisfied with their life. Eyesight was rated as slightly worse. Hearing was rated as slightly worse. Patient feels that their emotional and mental health rating is slightly better. Patients states they are never, rarely angry. Patient states they are often unusually tired/fatigued. Pain experienced in the last 7 days has been a lot. Patient's pain rating has been 7/10. Patient states that he has experienced weight loss or gain in last 6 months.     Depression Screening:   PHQ-9 Score: 3      Fall Risk Screening:   In the past year, patient has experienced: no history of falling in past year      Home Safety:  Patient does not have trouble with stairs inside or outside of their home. Patient has working smoke alarms and has working carbon monoxide detector. Home safety hazards include: none.     Nutrition:   Current diet is Limited junk food.     Medications:   Patient is currently taking over-the-counter supplements. OTC medications include: see medication list. Patient is able to manage medications.     Activities of Daily Living (ADLs)/Instrumental Activities of Daily Living (IADLs):   Walk and transfer into and out of bed and chair?: Yes  Dress and groom yourself?: Yes    Bathe or shower yourself?: Yes    Feed yourself? Yes  Do your laundry/housekeeping?: Yes  Manage your money, pay your bills and track your expenses?:  Yes  Make your own meals?: Yes    Do your own shopping?: Yes    ADL comments: sometimes difficulty putting on socks    Previous Hospitalizations:   Any hospitalizations or ED visits within the last 12 months?: No      Advance Care Planning:   Living will: Yes    Durable POA for healthcare: Yes    Advanced directive: Yes      Cognitive Screening:   Provider or family/friend/caregiver concerned regarding cognition?: No    PREVENTIVE SCREENINGS        Colorectal Cancer Screening:     General: Screening Current      Abdominal Aortic Aneurysm (AAA) Screening:    Risk factors include: age between 65-76 yo        Lung Cancer Screening:     General: Screening Not Indicated      Hepatitis C Screening:    General: Screening Current    Screening, Brief Intervention, and Referral to Treatment (SBIRT)    Screening  Typical number of drinks in a day: 0  Typical number of drinks in a week: 0  Interpretation: Low risk drinking behavior.    AUDIT-C Screenin) How often did you have a drink containing alcohol in the past year? monthly or less  2) How many drinks did you have on a typical day when you were drinking in the past year? 0  3) How often did you have 6 or more drinks on one occasion in the past year? never    AUDIT-C Score: 1  Interpretation: Score 0-3 (male): Negative screen for alcohol misuse    Single Item Drug Screening:  How often have you used an illegal drug (including marijuana) or a prescription medication for non-medical reasons in the past year? never    Single Item Drug Screen Score: 0  Interpretation: Negative screen for possible drug use disorder    Social Determinants of Health     Financial Resource Strain: Low Risk  (2023)    Overall Financial Resource Strain (CARDIA)     Difficulty of Paying Living Expenses: Not very hard   Food Insecurity: No Food Insecurity (2024)    Hunger Vital Sign     Worried About Running Out of Food in the Last Year: Never true     Ran Out of Food in the Last Year:  "Never true   Transportation Needs: No Transportation Needs (9/9/2024)    PRAPARE - Transportation     Lack of Transportation (Medical): No     Lack of Transportation (Non-Medical): No   Housing Stability: Low Risk  (9/9/2024)    Housing Stability Vital Sign     Unable to Pay for Housing in the Last Year: No     Number of Times Moved in the Last Year: 0     Homeless in the Last Year: No   Utilities: Not At Risk (9/9/2024)    [facility] Utilities     Threatened with loss of utilities: No     No results found.    Objective     /84 (BP Location: Left arm, Patient Position: Sitting, Cuff Size: Standard)   Pulse 69   Ht 6' 2\" (1.88 m)   Wt 126 kg (277 lb)   SpO2 95%   BMI 35.56 kg/m²     Physical Exam  Constitutional:       Appearance: He is obese.   HENT:      Head: Normocephalic and atraumatic.      Right Ear: External ear normal.      Left Ear: External ear normal.      Nose: Nose normal.      Mouth/Throat:      Mouth: Mucous membranes are moist.   Cardiovascular:      Rate and Rhythm: Normal rate and regular rhythm.   Pulmonary:      Effort: Pulmonary effort is normal.      Breath sounds: Normal breath sounds.   Musculoskeletal:         General: Normal range of motion.      Cervical back: Normal range of motion and neck supple.   Skin:     General: Skin is warm.      Capillary Refill: Capillary refill takes less than 2 seconds.   Neurological:      General: No focal deficit present.      Mental Status: He is alert and oriented to person, place, and time.   Psychiatric:         Mood and Affect: Mood normal.         Thought Content: Thought content normal.         Judgment: Judgment normal.         "

## 2024-10-18 ENCOUNTER — OFFICE VISIT (OUTPATIENT)
Dept: FAMILY MEDICINE CLINIC | Facility: CLINIC | Age: 69
End: 2024-10-18
Payer: MEDICARE

## 2024-10-18 VITALS
HEIGHT: 74 IN | OXYGEN SATURATION: 98 % | DIASTOLIC BLOOD PRESSURE: 82 MMHG | BODY MASS INDEX: 35.94 KG/M2 | HEART RATE: 52 BPM | SYSTOLIC BLOOD PRESSURE: 120 MMHG | WEIGHT: 280 LBS

## 2024-10-18 DIAGNOSIS — R09.81 CONGESTION OF NASAL SINUS: ICD-10-CM

## 2024-10-18 DIAGNOSIS — R94.30 LOW LEFT VENTRICULAR EJECTION FRACTION: Primary | ICD-10-CM

## 2024-10-18 DIAGNOSIS — D69.6 THROMBOCYTOPENIA (HCC): ICD-10-CM

## 2024-10-18 DIAGNOSIS — I51.7 LEFT VENTRICULAR HYPERTROPHY: ICD-10-CM

## 2024-10-18 DIAGNOSIS — J06.9 VIRAL UPPER RESPIRATORY TRACT INFECTION: ICD-10-CM

## 2024-10-18 DIAGNOSIS — I47.19 ATRIAL TACHYCARDIA (HCC): ICD-10-CM

## 2024-10-18 PROBLEM — I44.2 COMPLETE HEART BLOCK (HCC): Status: RESOLVED | Noted: 2024-09-16 | Resolved: 2024-10-18

## 2024-10-18 PROBLEM — J96.01 ACUTE RESPIRATORY FAILURE WITH HYPOXIA (HCC): Status: RESOLVED | Noted: 2024-09-16 | Resolved: 2024-10-18

## 2024-10-18 PROCEDURE — G2211 COMPLEX E/M VISIT ADD ON: HCPCS | Performed by: INTERNAL MEDICINE

## 2024-10-18 PROCEDURE — 99214 OFFICE O/P EST MOD 30 MIN: CPT | Performed by: INTERNAL MEDICINE

## 2024-10-18 RX ORDER — LEVALBUTEROL TARTRATE 45 UG/1
2 AEROSOL, METERED ORAL EVERY 6 HOURS PRN
COMMUNITY
Start: 2024-10-08

## 2024-10-18 NOTE — PROGRESS NOTES
Assessment/Plan:Cedric seems improved from his urgent care visit, was on a course of Augmentin and I told him to continue the zyrtec and flonase to cover any congestion/allergy symptoms-his BNP was high at urgent Care at 257, but I told Cedric I'm not so sure of the significance of this. His last echo, done in 8/23, did show diastolic dysfunction and Ef on the lower side of 45-50%-will order another echo to see where it's at now-follows up with Cardiology in Dec         Problem List Items Addressed This Visit       Atrial tachycardia (HCC)    Left ventricular hypertrophy    Thrombocytopenia (HCC)     Other Visit Diagnoses       Low left ventricular ejection fraction    -  Primary    Relevant Orders    Echo complete w/ contrast if indicated    Viral upper respiratory tract infection        Congestion of nasal sinus                  Subjective:      Patient ID: Cedric Kim is a 69 y.o. male.    Cedric here to follow up on his urgent care visit for sore throat, sob, was given nebulizer treatement and had some labwork done as well as CXR-his BNP was a bit elevated at 257 -he has a known EF of 45-50% and diastolic dysfunction-is supposed to follow up with his Cardiologist in Dec-feeling somewhat better now, was on Augmentin        The following portions of the patient's history were reviewed and updated as appropriate:   Past Medical History:  He has a past medical history of Alkaline phosphatase raised, Allergic (1964), Arthralgia of foot, Arthritis, Aspirin allergy, Asthma, Chronic back pain, Cobalamin deficiency, Coronary artery disease (2019), Displacement of cervical intervertebral disc without myelopathy, Edema of lower extremity, Gallstone, GERD (gastroesophageal reflux disease), Heart murmur, Hepatitis B, History of diabetes mellitus, History of echocardiogram (02/23/2017), History of EKG (09/15/2017), History of stress test (03/2017), HL (hearing loss) (2020), Hypertension, Hypoglycemia, Hypothyroidism, Increased body  mass index, Induration penis plastica, Infectious viral hepatitis (1998), Mononeuritis, Neuropathy, Non-alcoholic fatty liver disease, Obesity, Osteopenia, Osteoporosis, Otitis media (09/2020), Palpitations, Pituitary adenoma (HCC), Pneumonia, Scoliosis (1972), Sebaceous cyst, Testicular hypofunction, Thrombocytopenic disorder (HCC), Tremor, Urinary tract infection, Visual impairment, and Vitamin D deficiency.,  _______________________________________________________________________  Medical Problems:  does not have any pertinent problems on file.,  _______________________________________________________________________  Past Surgical History:   has a past surgical history that includes Lipoma resection (Right, 2014); Lumbar fusion (2010); Toenail excision (Left, 1993); Laminectomy; Mole removal; Hernia repair (1957); EGD (07/15/2019); EGD (04/03/2018); Back surgery (2010); Cyst Removal (1987); Cologuard (Historical) (04/01/2019); Cholecystectomy (?); Eye surgery (07/13/2022); Spine surgery (08/10/2010--02/26/2020); and Vasectomy (1976).,  _______________________________________________________________________  Family History:  family history includes Alcohol abuse in his father; Aortic stenosis in his father; Arthritis in his maternal grandmother and mother; Atrial fibrillation in his father; Coronary artery disease in his father; Dementia in his father; Hearing loss in his paternal grandfather; Heart disease in his father; Other in his father; Stroke in his father; Vision loss in his mother.,  _______________________________________________________________________  Social History:   reports that he has never smoked. He has never used smokeless tobacco. He reports that he does not currently use alcohol. He reports that he does not use drugs.,  _______________________________________________________________________  Allergies:  is allergic to carvedilol, metoprolol, aspirin, ciprofloxacin, clindamycin, duloxetine  "hcl, propranolol, and pollen extract..  _______________________________________________________________________  Current Outpatient Medications   Medication Sig Dispense Refill    acetaminophen (TYLENOL) 650 mg CR tablet Take 650 mg by mouth every 8 (eight) hours as needed      b complex vitamins capsule Take 1 capsule by mouth daily      Biotin 29482 MCG TABS       Calcium Carb-Cholecalciferol (CALCIUM/VITAMIN D PO)       Cholecalciferol (Vitamin D3) 50 MCG (2000 UT) TABS Take 2,000 Units by mouth daily      finasteride (PROSCAR) 5 mg tablet       levalbuterol (XOPENEX HFA) 45 mcg/act inhaler Inhale 2 puffs every 6 (six) hours as needed      lidocaine (LMX) 4 % cream Apply topically 2 (two) times a day 30 g 1    omeprazole (PriLOSEC) 20 mg delayed release capsule Take 20 mg by mouth daily      Polyethyl Glycol-Propyl Glycol (SYSTANE OP)       Silodosin 8 MG CAPS Take 1 capsule by mouth in the morning 1 po daily 30 capsule 1    verapamil (VERELAN PM) 180 MG 24 hr capsule       naproxen (NAPROSYN) 500 mg tablet TAKE ONE TABLET BY MOUTH DAILY AT BEDTIME as needed for leg pain 30 tablet 0    primidone (MYSOLINE) 50 mg tablet Take 150 mg by mouth 2 (two) times a day      pseudoephedrine (SUDAFED) 60 mg tablet Take 1 tablet (60 mg total) by mouth every 8 (eight) hours as needed for congestion 60 tablet 0     No current facility-administered medications for this visit.     _______________________________________________________________________  Review of Systems   Constitutional: Negative.    HENT:  Positive for congestion, sinus pressure and sore throat.    Respiratory:  Positive for shortness of breath.    Neurological:  Positive for headaches.         Objective:  Vitals:    10/18/24 1104   BP: 120/82   BP Location: Left arm   Patient Position: Sitting   Cuff Size: Standard   Pulse: (!) 52   SpO2: 98%   Weight: 127 kg (280 lb)   Height: 6' 2\" (1.88 m)     Body mass index is 35.95 kg/m².     Physical " Exam  Constitutional:       Appearance: Normal appearance.   HENT:      Head: Normocephalic and atraumatic.      Right Ear: Tympanic membrane, ear canal and external ear normal.      Left Ear: Tympanic membrane, ear canal and external ear normal.      Nose: Nose normal.      Mouth/Throat:      Mouth: Mucous membranes are moist.      Pharynx: Posterior oropharyngeal erythema present.   Cardiovascular:      Rate and Rhythm: Normal rate and regular rhythm.   Pulmonary:      Effort: Pulmonary effort is normal.      Breath sounds: Normal breath sounds.   Musculoskeletal:      Cervical back: Normal range of motion and neck supple.   Neurological:      Mental Status: He is alert.

## 2024-10-24 ENCOUNTER — OFFICE VISIT (OUTPATIENT)
Dept: FAMILY MEDICINE CLINIC | Facility: CLINIC | Age: 69
End: 2024-10-24
Payer: MEDICARE

## 2024-10-24 ENCOUNTER — NURSE TRIAGE (OUTPATIENT)
Age: 69
End: 2024-10-24

## 2024-10-24 VITALS
WEIGHT: 277 LBS | HEART RATE: 60 BPM | SYSTOLIC BLOOD PRESSURE: 146 MMHG | OXYGEN SATURATION: 94 % | DIASTOLIC BLOOD PRESSURE: 80 MMHG | HEIGHT: 74 IN | BODY MASS INDEX: 35.55 KG/M2

## 2024-10-24 DIAGNOSIS — L53.9 ERYTHEMA: ICD-10-CM

## 2024-10-24 DIAGNOSIS — R19.7 DIARRHEA, UNSPECIFIED TYPE: Primary | ICD-10-CM

## 2024-10-24 PROCEDURE — 99214 OFFICE O/P EST MOD 30 MIN: CPT | Performed by: INTERNAL MEDICINE

## 2024-10-24 PROCEDURE — G2211 COMPLEX E/M VISIT ADD ON: HCPCS | Performed by: INTERNAL MEDICINE

## 2024-10-24 RX ORDER — FLUCONAZOLE 150 MG/1
150 TABLET ORAL ONCE
Qty: 1 TABLET | Refills: 3 | Status: SHIPPED | OUTPATIENT
Start: 2024-10-24 | End: 2024-10-24

## 2024-10-24 RX ORDER — DIPHENOXYLATE HYDROCHLORIDE AND ATROPINE SULFATE 2.5; .025 MG/1; MG/1
1 TABLET ORAL 3 TIMES DAILY PRN
Qty: 30 TABLET | Refills: 0 | Status: SHIPPED | OUTPATIENT
Start: 2024-10-24

## 2024-10-24 RX ORDER — NYSTATIN 100000 U/G
CREAM TOPICAL 2 TIMES DAILY
Qty: 30 G | Refills: 3 | Status: SHIPPED | OUTPATIENT
Start: 2024-10-24

## 2024-10-24 NOTE — PROGRESS NOTES
"Assessment/Plan:Will try some lomotil and recommended clear electrolyte containing fluids and bland diet, less gluten and dairy-IB guard (probiotic) ok to take and will hold off on GI referral for now-he had a normal colonoscopy in Sept-for rash in groin, will try some diflucan and nystatin cream and zinc oxide cream-will refer to Dermatology for possible biopsy-no contraindications (no open sores in groin) to spinal cord stimulator         Problem List Items Addressed This Visit    None  Visit Diagnoses       Diarrhea, unspecified type    -  Primary    Relevant Medications    diphenoxylate-atropine (LOMOTIL) 2.5-0.025 mg per tablet    Erythema        Relevant Medications    fluconazole (DIFLUCAN) 150 mg tablet    nystatin (MYCOSTATIN) cream    Other Relevant Orders    Ambulatory Referral to Dermatology              Subjective:      Patient ID: Cedric Kim is a 69 y.o. male.    Cedric started with bad diarrhea after eating out this weekend, has had it for several days now, no n/v/fever-this am it was the worst-explosive all over the floor and walls of his bathroom etc-he's tried immodium and pepto, he was also seen by Urgent Care and they tested him for C diff and a million other things by way of stool cutlure, all of which were negative-he was just on Augmentin for a URI-also, he was supposed to get a spinal cord stimulator placed, but he made mention of a groin lesion he's had for quite some time. Upon exam the spot is really just erythema, chafing, not an \"open sore\" as was described to the spinal cord folks. No lymphadenopathy    Diarrhea   Associated symptoms include abdominal pain. Pertinent negatives include no vomiting.       The following portions of the patient's history were reviewed and updated as appropriate:   Past Medical History:  He has a past medical history of Alkaline phosphatase raised, Allergic (1964), Arthralgia of foot, Arthritis, Aspirin allergy, Asthma, Chronic back pain, Cobalamin " deficiency, Coronary artery disease (2019), Displacement of cervical intervertebral disc without myelopathy, Edema of lower extremity, Gallstone, GERD (gastroesophageal reflux disease), Heart murmur, Hepatitis B, History of diabetes mellitus, History of echocardiogram (02/23/2017), History of EKG (09/15/2017), History of stress test (03/2017), HL (hearing loss) (2020), Hypertension, Hypoglycemia, Hypothyroidism, Increased body mass index, Induration penis plastica, Infectious viral hepatitis (1998), Mononeuritis, Neuropathy, Non-alcoholic fatty liver disease, Obesity, Osteopenia, Osteoporosis, Otitis media (09/2020), Palpitations, Pituitary adenoma (HCC), Pneumonia, Scoliosis (1972), Sebaceous cyst, Testicular hypofunction, Thrombocytopenic disorder (HCC), Tremor, Urinary tract infection, Visual impairment, and Vitamin D deficiency.,  _______________________________________________________________________  Medical Problems:  does not have any pertinent problems on file.,  _______________________________________________________________________  Past Surgical History:   has a past surgical history that includes Lipoma resection (Right, 2014); Lumbar fusion (2010); Toenail excision (Left, 1993); Laminectomy; Mole removal; Hernia repair (1957); EGD (07/15/2019); EGD (04/03/2018); Back surgery (2010); Cyst Removal (1987); Cologuard (Historical) (04/01/2019); Cholecystectomy (?); Eye surgery (07/13/2022); Spine surgery (08/10/2010--02/26/2020); and Vasectomy (1976).,  _______________________________________________________________________  Family History:  family history includes Alcohol abuse in his father; Aortic stenosis in his father; Arthritis in his maternal grandmother and mother; Atrial fibrillation in his father; Coronary artery disease in his father; Dementia in his father; Hearing loss in his paternal grandfather; Heart disease in his father; Other in his father; Stroke in his father; Vision loss in his  mother.,  _______________________________________________________________________  Social History:   reports that he has never smoked. He has never used smokeless tobacco. He reports that he does not currently use alcohol. He reports that he does not use drugs.,  _______________________________________________________________________  Allergies:  is allergic to carvedilol, metoprolol, aspirin, ciprofloxacin, clindamycin, duloxetine hcl, propranolol, and pollen extract..  _______________________________________________________________________  Current Outpatient Medications   Medication Sig Dispense Refill    acetaminophen (TYLENOL) 650 mg CR tablet Take 650 mg by mouth every 8 (eight) hours as needed      b complex vitamins capsule Take 1 capsule by mouth daily      Biotin 41480 MCG TABS       Calcium Carb-Cholecalciferol (CALCIUM/VITAMIN D PO)       Cholecalciferol (Vitamin D3) 50 MCG (2000 UT) TABS Take 2,000 Units by mouth daily      diphenoxylate-atropine (LOMOTIL) 2.5-0.025 mg per tablet Take 1 tablet by mouth 3 (three) times a day as needed for diarrhea 30 tablet 0    finasteride (PROSCAR) 5 mg tablet       fluconazole (DIFLUCAN) 150 mg tablet Take 1 tablet (150 mg total) by mouth once for 1 dose 1 tablet 3    levalbuterol (XOPENEX HFA) 45 mcg/act inhaler Inhale 2 puffs every 6 (six) hours as needed      lidocaine (LMX) 4 % cream Apply topically 2 (two) times a day 30 g 1    nystatin (MYCOSTATIN) cream Apply topically 2 (two) times a day 30 g 3    omeprazole (PriLOSEC) 20 mg delayed release capsule Take 20 mg by mouth daily      Polyethyl Glycol-Propyl Glycol (SYSTANE OP)       Silodosin 8 MG CAPS Take 1 capsule by mouth in the morning 1 po daily 30 capsule 1    verapamil (VERELAN PM) 180 MG 24 hr capsule       primidone (MYSOLINE) 50 mg tablet Take 150 mg by mouth 2 (two) times a day       No current facility-administered medications for this visit.  "    _______________________________________________________________________  Review of Systems   Constitutional: Negative.    HENT: Negative.     Gastrointestinal:  Positive for abdominal pain and diarrhea. Negative for blood in stool, nausea and vomiting.   Skin:  Positive for rash.         Objective:  Vitals:    10/24/24 1430   BP: 146/80   BP Location: Left arm   Patient Position: Sitting   Cuff Size: Standard   Pulse: 60   SpO2: 94%   Weight: 126 kg (277 lb)   Height: 6' 2\" (1.88 m)     Body mass index is 35.56 kg/m².     Physical Exam  Constitutional:       Appearance: Normal appearance.   HENT:      Head: Normocephalic and atraumatic.      Right Ear: External ear normal.      Left Ear: External ear normal.      Nose: Nose normal.      Mouth/Throat:      Mouth: Mucous membranes are moist.   Cardiovascular:      Rate and Rhythm: Normal rate and regular rhythm.   Pulmonary:      Effort: Pulmonary effort is normal.      Breath sounds: Normal breath sounds.   Abdominal:      Palpations: Abdomen is soft.      Comments: Increased bowel sounds   Musculoskeletal:         General: Normal range of motion.      Cervical back: Normal range of motion and neck supple.   Skin:     Findings: Erythema present.      Comments: Area right groin of chafed red shiny skin-NO OPEN SORE on exam   Neurological:      General: No focal deficit present.      Mental Status: He is alert and oriented to person, place, and time.   Psychiatric:         Mood and Affect: Mood normal.         "

## 2024-10-24 NOTE — TELEPHONE ENCOUNTER
"Please advise . Asking if there is something else he should take , be seen for follow up? Patient was seen at an urgent care ( not ) for severe diarrhea. Stool work up done at [facility] lab, patient states tests are all negative. Taking Dicyclomine prescribed by Urgent Care. Tried Imodium in the beginning , the first three days , did not help. Reports fever at onset of Diarrhea on Saturday which resolved . Was on antibiotics in the beginning of the month. See assessment questions.  .    Reason for Disposition   SEVERE diarrhea (e.g., 7 or more times / day more than normal)    Answer Assessment - Initial Assessment Questions  1. DIARRHEA SEVERITY: \"How bad is the diarrhea?\" \"How many more stools have you had in the past 24 hours than normal?\"       3-4 x  2. ONSET: \"When did the diarrhea begin?\"       Saturday  3. STOOL DESCRIPTION:  \"How loose or watery is the diarrhea?\" \"What is the stool color?\" \"Is there any blood or mucous in the stool?\"      Watery to loose stools  4. VOMITING: \"Are you also vomiting?\" If Yes, ask: \"How many times in the past 24 hours?\"       Denies   5. ABDOMEN PAIN: \"Are you having any abdomen pain?\" If Yes, ask: \"What does it feel like?\" (e.g., crampy, dull, intermittent, constant)       Episodal cramping    6. ABDOMEN PAIN SEVERITY: If present, ask: \"How bad is the pain?\"  (e.g., Scale 1-10; mild, moderate, or severe)      N/a  7. ORAL INTAKE: If vomiting, \"Have you been able to drink liquids?\" \"How much liquids have you had in the past 24 hours?\"      64 ounces   8. HYDRATION: \"Any signs of dehydration?\" (e.g., dry mouth [not just dry lips], too weak to stand, dizziness, new weight loss) \"When did you last urinate?\"      Denies   9. EXPOSURE: \"Have you traveled to a foreign country recently?\" \"Have you been exposed to anyone with diarrhea?\" \"Could you have eaten any food that was spoiled?\"      Ate out Saturday , ate eggs  10. ANTIBIOTIC USE: \"Are you taking antibiotics now or have you taken " "antibiotics in the past 2 months?\"        Yes, Antibiotics 10/11 from Urgent Care   11. OTHER SYMPTOMS: \"Do you have any other symptoms?\" (e.g., fever, blood in stool)        Fever two night ago   12. PREGNANCY: \"Is there any chance you are pregnant?\" \"When was your last menstrual period?\"        N/a    Protocols used: Diarrhea-Adult-[place]    "

## 2024-10-24 NOTE — TELEPHONE ENCOUNTER
Regarding: diarrhea  ----- Message from Daxa ZARATE sent at 10/24/2024  9:15 AM EDT -----  Patient has severe diarrhea even after being seen at the urgent care. Patient agreed to speak to Nurse

## 2024-10-24 NOTE — TELEPHONE ENCOUNTER
Pt has been using pepto an imodium for x3 days . Pt said he was tested for c-diff and it came back negative

## 2024-10-24 NOTE — TELEPHONE ENCOUNTER
Probably just antibiotic assoicate diarrhea-can try pepto or immodium, probiotics-if it continues, we may have to check stool for C diff-stay hydrated with electrolyte containing fluids

## 2024-12-10 ENCOUNTER — HOSPITAL ENCOUNTER (OUTPATIENT)
Dept: NON INVASIVE DIAGNOSTICS | Facility: CLINIC | Age: 69
Discharge: HOME | End: 2024-12-10
Payer: MEDICARE

## 2024-12-10 VITALS
BODY MASS INDEX: 35.55 KG/M2 | HEART RATE: 60 BPM | HEIGHT: 74 IN | SYSTOLIC BLOOD PRESSURE: 146 MMHG | WEIGHT: 277 LBS | DIASTOLIC BLOOD PRESSURE: 80 MMHG

## 2024-12-10 DIAGNOSIS — R94.30 LOW LEFT VENTRICULAR EJECTION FRACTION: ICD-10-CM

## 2024-12-10 LAB
AORTIC ROOT: 3.4 CM
APICAL FOUR CHAMBER EJECTION FRACTION: 41 %
ASCENDING AORTA: 3.8 CM
BSA FOR ECHO PROCEDURE: 2.5 M2
DOP CALC LVOT AREA: 3.14 CM2
DOP CALC LVOT DIAMETER: 2 CM
E WAVE DECELERATION TIME: 285 MS
E/A RATIO: 0.58
FRACTIONAL SHORTENING: 20 (ref 28–44)
INTERVENTRICULAR SEPTUM IN DIASTOLE (PARASTERNAL SHORT AXIS VIEW): 1.5 CM
INTERVENTRICULAR SEPTUM: 1.5 CM (ref 0.6–1.1)
LAAS-AP2: 28.2 CM2
LAAS-AP4: 27.2 CM2
LEFT ATRIUM SIZE: 5.3 CM
LEFT ATRIUM VOLUME (MOD BIPLANE): 104 ML
LEFT ATRIUM VOLUME INDEX (MOD BIPLANE): 41.6 ML/M2
LEFT INTERNAL DIMENSION IN SYSTOLE: 4.4 CM (ref 2.1–4)
LEFT VENTRICULAR INTERNAL DIMENSION IN DIASTOLE: 5.5 CM (ref 3.5–6)
LEFT VENTRICULAR POSTERIOR WALL IN END DIASTOLE: 1.3 CM
LEFT VENTRICULAR STROKE VOLUME: 59 ML
LVSV (TEICH): 59 ML
MV E'TISSUE VEL-LAT: 9 CM/S
MV E'TISSUE VEL-SEP: 6 CM/S
MV PEAK A VEL: 0.91 M/S
MV PEAK E VEL: 53 CM/S
MV STENOSIS PRESSURE HALF TIME: 83 MS
MV VALVE AREA P 1/2 METHOD: 2.65
RIGHT ATRIAL 2D VOLUME: 71 ML
RIGHT ATRIUM AREA SYSTOLE A4C: 22.2 CM2
RIGHT VENTRICLE ID DIMENSION: 4.1 CM
SINOTUBULAR JUNCTION: 3.2 CM
SL CV LEFT ATRIUM LENGTH A2C: 6.2 CM
SL CV LV EF: 45
SL CV PED ECHO LEFT VENTRICLE DIASTOLIC VOLUME (MOD BIPLANE) 2D: 146 ML
SL CV PED ECHO LEFT VENTRICLE SYSTOLIC VOLUME (MOD BIPLANE) 2D: 87 ML
SL CV SINUS OF VALSALVA 2D: 3.6 CM
STJ: 3.2 CM
TR MAX PG: 19 MMHG
TR PEAK VELOCITY: 2.2 M/S
TRICUSPID ANNULAR PLANE SYSTOLIC EXCURSION: 2.7 CM
TRICUSPID VALVE PEAK REGURGITATION VELOCITY: 2.18 M/S

## 2024-12-10 PROCEDURE — 93306 TTE W/DOPPLER COMPLETE: CPT

## 2024-12-10 PROCEDURE — 93306 TTE W/DOPPLER COMPLETE: CPT | Performed by: STUDENT IN AN ORGANIZED HEALTH CARE EDUCATION/TRAINING PROGRAM

## 2025-01-24 ENCOUNTER — VBI (OUTPATIENT)
Dept: ADMINISTRATIVE | Facility: OTHER | Age: 70
End: 2025-01-24

## 2025-01-24 NOTE — TELEPHONE ENCOUNTER
01/24/25 8:26 AM     Chart reviewed for   PREV-13: Statin Therapy for the Prevention and Treatment of Cardiovascular Disease    was/were submitted to the patient's insurance.     Esther Frey   [facility]

## 2025-02-17 ENCOUNTER — VBI (OUTPATIENT)
Dept: ADMINISTRATIVE | Facility: OTHER | Age: 70
End: 2025-02-17

## 2025-02-17 NOTE — TELEPHONE ENCOUNTER
02/17/25 8:58 AM     Chart reviewed for CRC: Colonoscopy was/were submitted to the patient's insurance.     Anglea Main MA   [facility]

## 2025-02-24 ENCOUNTER — VBI (OUTPATIENT)
Dept: ADMINISTRATIVE | Facility: OTHER | Age: 70
End: 2025-02-24

## 2025-02-24 NOTE — TELEPHONE ENCOUNTER
02/24/25 10:36 AM     Chart reviewed for Blood Pressure was/were submitted to the patient's insurance.     Mojgan Bah MA   [facility]

## 2025-02-24 NOTE — TELEPHONE ENCOUNTER
02/24/25 11:13 AM     Chart reviewed for   PREV-13: Statin Therapy for the Prevention and Treatment of Cardiovascular Disease   was/were submitted to the patient's insurance.     Mary Evans MA   [facility]

## 2025-03-26 PROBLEM — K31.A0 INTESTINAL METAPLASIA OF GASTRIC CARDIA: Status: RESOLVED | Noted: 2020-12-30 | Resolved: 2025-03-26

## 2025-07-10 PROCEDURE — 88305 TISSUE EXAM BY PATHOLOGIST: CPT | Performed by: PATHOLOGY

## 2025-07-11 ENCOUNTER — LAB REQUISITION (OUTPATIENT)
Dept: LAB | Facility: HOSPITAL | Age: 70
End: 2025-07-11
Payer: MEDICARE

## 2025-07-11 DIAGNOSIS — D48.5 NEOPLASM OF UNCERTAIN BEHAVIOR OF SKIN: ICD-10-CM

## 2025-07-17 PROCEDURE — 88305 TISSUE EXAM BY PATHOLOGIST: CPT | Performed by: PATHOLOGY

## 2025-08-05 ENCOUNTER — NURSE TRIAGE (OUTPATIENT)
Age: 70
End: 2025-08-05

## 2025-08-05 ENCOUNTER — OFFICE VISIT (OUTPATIENT)
Dept: FAMILY MEDICINE CLINIC | Facility: CLINIC | Age: 70
End: 2025-08-05
Payer: MEDICARE

## 2025-08-05 VITALS
DIASTOLIC BLOOD PRESSURE: 80 MMHG | TEMPERATURE: 98.8 F | SYSTOLIC BLOOD PRESSURE: 136 MMHG | HEIGHT: 74 IN | RESPIRATION RATE: 18 BRPM | BODY MASS INDEX: 35.42 KG/M2 | OXYGEN SATURATION: 96 % | WEIGHT: 276 LBS | HEART RATE: 70 BPM

## 2025-08-05 DIAGNOSIS — N64.4 BREAST TENDERNESS: ICD-10-CM

## 2025-08-05 DIAGNOSIS — M54.42 ACUTE LEFT-SIDED LOW BACK PAIN WITH LEFT-SIDED SCIATICA: Primary | ICD-10-CM

## 2025-08-05 PROBLEM — M54.40 ACUTE LEFT-SIDED LOW BACK PAIN WITH SCIATICA: Status: ACTIVE | Noted: 2025-08-05

## 2025-08-05 PROCEDURE — G2211 COMPLEX E/M VISIT ADD ON: HCPCS | Performed by: FAMILY MEDICINE

## 2025-08-05 PROCEDURE — 99213 OFFICE O/P EST LOW 20 MIN: CPT | Performed by: FAMILY MEDICINE

## 2025-08-05 RX ORDER — TRAMADOL HYDROCHLORIDE 50 MG/1
50 TABLET ORAL EVERY 6 HOURS PRN
Qty: 20 TABLET | Refills: 0 | Status: SHIPPED | OUTPATIENT
Start: 2025-08-05

## 2025-08-05 RX ORDER — METHYLPREDNISOLONE 4 MG/1
TABLET ORAL
Qty: 21 EACH | Refills: 0 | Status: SHIPPED | OUTPATIENT
Start: 2025-08-05

## 2025-08-14 ENCOUNTER — TELEPHONE (OUTPATIENT)
Age: 70
End: 2025-08-14